# Patient Record
Sex: MALE | Race: WHITE | NOT HISPANIC OR LATINO | Employment: FULL TIME | URBAN - METROPOLITAN AREA
[De-identification: names, ages, dates, MRNs, and addresses within clinical notes are randomized per-mention and may not be internally consistent; named-entity substitution may affect disease eponyms.]

---

## 2017-06-29 ENCOUNTER — ALLSCRIPTS OFFICE VISIT (OUTPATIENT)
Dept: OTHER | Facility: OTHER | Age: 27
End: 2017-06-29

## 2018-01-14 VITALS
SYSTOLIC BLOOD PRESSURE: 113 MMHG | WEIGHT: 315 LBS | TEMPERATURE: 98.8 F | DIASTOLIC BLOOD PRESSURE: 80 MMHG | HEIGHT: 72 IN | BODY MASS INDEX: 42.66 KG/M2

## 2018-08-20 ENCOUNTER — OFFICE VISIT (OUTPATIENT)
Dept: FAMILY MEDICINE CLINIC | Facility: CLINIC | Age: 28
End: 2018-08-20
Payer: COMMERCIAL

## 2018-08-20 VITALS
BODY MASS INDEX: 42.66 KG/M2 | WEIGHT: 315 LBS | SYSTOLIC BLOOD PRESSURE: 102 MMHG | HEIGHT: 72 IN | DIASTOLIC BLOOD PRESSURE: 70 MMHG | TEMPERATURE: 99.2 F

## 2018-08-20 DIAGNOSIS — H60.332 ACUTE SWIMMER'S EAR OF LEFT SIDE: Primary | ICD-10-CM

## 2018-08-20 PROBLEM — E66.01 MORBID OBESITY WITH BMI OF 40.0-44.9, ADULT (HCC): Status: ACTIVE | Noted: 2017-06-29

## 2018-08-20 PROCEDURE — 99213 OFFICE O/P EST LOW 20 MIN: CPT | Performed by: FAMILY MEDICINE

## 2018-08-20 PROCEDURE — 3008F BODY MASS INDEX DOCD: CPT | Performed by: FAMILY MEDICINE

## 2018-08-20 RX ORDER — NEOMYCIN SULFATE, POLYMYXIN B SULFATE AND HYDROCORTISONE 10; 3.5; 1 MG/ML; MG/ML; [USP'U]/ML
4 SUSPENSION/ DROPS AURICULAR (OTIC) 4 TIMES DAILY
Qty: 10 ML | Refills: 0 | Status: SHIPPED | OUTPATIENT
Start: 2018-08-20 | End: 2019-03-07 | Stop reason: ALTCHOICE

## 2018-08-20 NOTE — LETTER
August 20, 2018     Patient: Lucrecia Connor   YOB: 1990   Date of Visit: 8/20/2018       To Whom it May Concern:    Ariana Ulloa is under my professional care  He was seen in my office on 8/20/2018  He may return to work on 8/21/2018  If you have any questions or concerns, please don't hesitate to call           Sincerely,          Gabe Browning MD        CC: Mkiayla Settler Senderling

## 2018-08-20 NOTE — PROGRESS NOTES
Assessment/Plan:  No problem-specific Assessment & Plan notes found for this encounter  Diagnoses and all orders for this visit:    Acute swimmer's ear of left side  -     neomycin-polymyxin-hydrocortisone (CORTISPORIN) 0 35%-10,000 units/mL-1% otic suspension; Administer 4 drops into the left ear 4 (four) times a day          Subjective:   Chief Complaint   Patient presents with    Earache     Left   Saturday I noted my hearing was worse  Sunday started to hurt and couldnt sleep last night  Some drainage  Tried to irrigate with hot water  No swimming for a couple of weeks  No URI or LRI sx  Patient ID: Guillermina Reeves is a 24 y o  male  HPI  Patient is a 14-year-old male who states that on Saturday he thought the hearing on his left side was getting worse  By Sunday he started to have some otalgia and some minimal drainage by last night he could not sleep because the pain was so bad  He tried irrigate the year with hot water yesterday with no success  He has had no swimming recently but had been swimming as little as 2 weeks ago  He has no URI or L RI symptoms no fever or other constitutional symptoms  The following portions of the patient's history were reviewed and updated as appropriate: allergies, past medical history, past social history and problem list     ROS    Review of systems as per HPI  Objective:    Physical Exam   Constitutional: He appears well-developed and well-nourished  Morbidly obese and in no distress   HENT:   Right ear canal and TM are within normal limits  Left ear canal is severely swollen with erythema and some minimal drainage  There is no cerumen noted than limited view of tympanic membrane appears normal   A Merocel ear wick was inserted along with Cortisporin drops  Patient stated felt almost immediately better

## 2018-08-20 NOTE — ASSESSMENT & PLAN NOTE
The patient is suffering from an acute otitis external on the left  We placed an ear wick today with Cortisporin otic suspension  He is going to use same 4 drops q i d  for the next week  We instructed him to observe for wick extrusion in the next 24-48 hours  Will continue to use drops q i d  as noted above and no call 2-3 days if he is not seen dramatic improvement or in 1 week if his symptoms have not completely resolved  He agrees

## 2018-08-22 ENCOUNTER — OFFICE VISIT (OUTPATIENT)
Dept: FAMILY MEDICINE CLINIC | Facility: CLINIC | Age: 28
End: 2018-08-22
Payer: COMMERCIAL

## 2018-08-22 VITALS — DIASTOLIC BLOOD PRESSURE: 80 MMHG | TEMPERATURE: 98.3 F | SYSTOLIC BLOOD PRESSURE: 122 MMHG

## 2018-08-22 DIAGNOSIS — H60.332 ACUTE SWIMMER'S EAR OF LEFT SIDE: Primary | ICD-10-CM

## 2018-08-22 PROCEDURE — 1036F TOBACCO NON-USER: CPT | Performed by: FAMILY MEDICINE

## 2018-08-22 PROCEDURE — 99213 OFFICE O/P EST LOW 20 MIN: CPT | Performed by: FAMILY MEDICINE

## 2018-08-22 NOTE — PROGRESS NOTES
Assessment/Plan:  Acute swimmer's ear of left side  His ear wick was removed today  He states he felt some relief almost immediately  He is going to continue to use Cortisporin otic suspension q 4 hours while awake  Will continue to use moist warm compresses as well as Advil or Motrin  He is asked to call tomorrow with report of his condition  He agrees  Diagnoses and all orders for this visit:    Acute swimmer's ear of left side          Subjective:   Chief Complaint   Patient presents with    Earache        Patient ID: Katie Deleon is a 24 y o  male  HPI  The patient is a 56-year-old male who was seen yesterday with an acute otitis externa  A ear wick was placed and he was started on Cortisporin otic suspension  He states that initially he felt some improvement but he began having difficulty getting to drops into his ear  He had a difficult time sleeping last night  He used warm compresses but had increased otalgia and difficulty hearing  He states that today the year feel similar to yesterday though he feels like he has some less discomfort in his neck and submandibular region  He has had no fevers chills or constitutional symptoms  The following portions of the patient's history were reviewed and updated as appropriate: allergies, current medications, past medical history, past social history and problem list     ROS    Limited review of systems as per HPI  Objective:    Physical Exam   Constitutional: He is oriented to person, place, and time  He appears well-developed and well-nourished  No distress  Morbidly obese   HENT:   Right Ear: External ear normal    Left external ear reveals some minimal erythema around the distal ear canal   Ear wick is in place  This is removed without difficulty  Ear canal diameter is significantly larger than it was yesterday    He continues to have some erythema and induration of the canal   Observe portion of tympanic membrane appears normal   There is no significant drainage noted  Ear wick had no foul smell and has some thin yellow discharge   Neurological: He is alert and oriented to person, place, and time

## 2018-08-22 NOTE — ASSESSMENT & PLAN NOTE
His ear wick was removed today  He states he felt some relief almost immediately  He is going to continue to use Cortisporin otic suspension q 4 hours while awake  Will continue to use moist warm compresses as well as Advil or Motrin  He is asked to call tomorrow with report of his condition  He agrees

## 2018-08-22 NOTE — LETTER
August 22, 2018     Patient: Kyler Zhang   YOB: 1990   Date of Visit: 8/22/2018       To Whom it May Concern:    Lakia Dillon is under my professional care  He was seen in my office on 8/22/2018  He may return to work on 8/23/2018  If you have any questions or concerns, please don't hesitate to call           Sincerely,          Lety Dumont MD        CC: No Recipients

## 2019-03-07 ENCOUNTER — OFFICE VISIT (OUTPATIENT)
Dept: FAMILY MEDICINE CLINIC | Facility: CLINIC | Age: 29
End: 2019-03-07

## 2019-03-07 VITALS
DIASTOLIC BLOOD PRESSURE: 72 MMHG | TEMPERATURE: 98.9 F | BODY MASS INDEX: 42.66 KG/M2 | WEIGHT: 315 LBS | SYSTOLIC BLOOD PRESSURE: 116 MMHG | HEART RATE: 84 BPM | HEIGHT: 72 IN | OXYGEN SATURATION: 97 %

## 2019-03-07 DIAGNOSIS — G47.30 SLEEP-DISORDERED BREATHING: Primary | ICD-10-CM

## 2019-03-07 DIAGNOSIS — S81.011D LACERATION OF RIGHT KNEE, SUBSEQUENT ENCOUNTER: ICD-10-CM

## 2019-03-07 DIAGNOSIS — Z23 ENCOUNTER FOR IMMUNIZATION: ICD-10-CM

## 2019-03-07 PROBLEM — H60.332 ACUTE SWIMMER'S EAR OF LEFT SIDE: Status: RESOLVED | Noted: 2018-08-20 | Resolved: 2019-03-07

## 2019-03-07 PROBLEM — S81.011A LACERATION OF RIGHT KNEE: Status: ACTIVE | Noted: 2019-03-07

## 2019-03-07 PROCEDURE — 3008F BODY MASS INDEX DOCD: CPT | Performed by: FAMILY MEDICINE

## 2019-03-07 PROCEDURE — 99214 OFFICE O/P EST MOD 30 MIN: CPT | Performed by: FAMILY MEDICINE

## 2019-03-07 PROCEDURE — 1036F TOBACCO NON-USER: CPT | Performed by: FAMILY MEDICINE

## 2019-03-07 NOTE — PROGRESS NOTES
Assessment/Plan:  Laceration of right knee  Patient suffered a laceration of his right knee 3 weeks ago  He presents today for suture removal   He felt the 5 sutures were initially placed but only 3 were in countered  These were removed without difficulty  He does have a significant burden of scab over the wound  Initial attempted deep breathing produce significant bleeding  It was decided to leave the scab in place  Bandage was applied  He is asked to soak the scab frequently and soapy water until it sloughs  Should any sutures remain he is asked to return for further evaluation  Sleep-disordered breathing  The patient has multiple physical findings and symptoms consistent with obstructive sleep apnea  He has a neck circumference of 19 inches  He has witnessed apnea  He has sedentary sleeping  He has gasping with awakening  He has on refreshing sleep  We are going to refer him to [facility] for further evaluation  He agrees  Diagnoses and all orders for this visit:    Sleep-disordered breathing  -     Ambulatory referral to Sleep Medicine; Future    Laceration of right knee, subsequent encounter    Encounter for immunization  -     SYRINGE/SINGLE-DOSE VIAL: influenza vaccine, [id number], quadrivalent, 0 5 mL, preservative-free (AFLURIA, FLUARIX, FLULAVAL, FLUZONE)    BMI 50 0-59 9, adult ([facility] [address] )          Subjective:   Chief Complaint   Patient presents with    Suture / Staple Removal     R knee  pt declined the flu shot  Patient ID: Kanika Duvall is a 34 y o  male  HPI  The patient is a 56-year-old male who states that 3 weeks ago he slipped on ice and fell down striking his right knee while up in [place]  He went to a local urgent care center where he states the wound was repaired with 2 regular sutures and 3 mattress sutures he was told  Since that time he has had no issue with the wound    He has had no drainage, streaking no fevers chills or other symptoms  He seems to have normal range of motion in the knee  No calf pain edema X cetera  Additionally he states that 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 until he was evaluated for obstructive sleep apnea  Initially he declined but more recently he has been concerned that he has significant snoring, wakes up gasping for breath and girlfriend has noticed apnea  He also has on refreshing sleep and sedentary sleeping  He is morbidly obese  He has no chest pain shortness of breath focal neurologic symptoms, headaches X cetera  The following portions of the patient's history were reviewed and updated as appropriate: allergies, current medications, past family history, past medical history, past social history, past surgical history and problem list     Review of Systems   Constitution: Positive for weight loss  Negative for decreased appetite, malaise/fatigue, night sweats and weight gain  Respiratory: Positive for sleep disturbances due to breathing and snoring  Negative for cough, shortness of breath, sputum production and wheezing  Skin: Positive for itching  Negative for rash  Neurological: Positive for excessive daytime sleepiness  Negative for dizziness and headaches  Objective:    Physical Exam   Constitutional: He appears well-developed and well-nourished  No distress  Morbid obesity   Neck:   Neck circumference is 19 inches  Cardiovascular: Normal rate and regular rhythm  Pulmonary/Chest: Effort normal and breath sounds normal    Skin:   Patient has a horizontal wound over his right mid patella  It is approximately 2 5 cm in length  There is significant eschar noted in the area of the wound  The two mattress sutures are removed and 1 standard suture  These are the only sutures noted though again there is significant scabbing present  There is no drainage from the wound, no lymphangitis streaking X cetera     Psychiatric: He has a normal mood and affect  Thought content normal    Nursing note and vitals reviewed

## 2019-03-08 NOTE — ASSESSMENT & PLAN NOTE
The patient has multiple physical findings and symptoms consistent with obstructive sleep apnea  He has a neck circumference of 19 inches  He has witnessed apnea  He has sedentary sleeping  He has gasping with awakening  He has on refreshing sleep  We are going to refer him to [facility] for further evaluation  He agrees

## 2019-03-08 NOTE — ASSESSMENT & PLAN NOTE
Patient suffered a laceration of his right knee 3 weeks ago  He presents today for suture removal   He felt the 5 sutures were initially placed but only 3 were in countered  These were removed without difficulty  He does have a significant burden of scab over the wound  Initial attempted deep breathing produce significant bleeding  It was decided to leave the scab in place  Bandage was applied  He is asked to soak the scab frequently and soapy water until it sloughs  Should any sutures remain he is asked to return for further evaluation

## 2019-04-16 ENCOUNTER — OFFICE VISIT (OUTPATIENT)
Dept: SLEEP CENTER | Facility: CLINIC | Age: 29
End: 2019-04-16
Payer: COMMERCIAL

## 2019-04-16 VITALS
BODY MASS INDEX: 42.66 KG/M2 | HEIGHT: 72 IN | WEIGHT: 315 LBS | DIASTOLIC BLOOD PRESSURE: 70 MMHG | SYSTOLIC BLOOD PRESSURE: 110 MMHG

## 2019-04-16 DIAGNOSIS — G47.30 SLEEP-DISORDERED BREATHING: ICD-10-CM

## 2019-04-16 DIAGNOSIS — G47.33 OSA (OBSTRUCTIVE SLEEP APNEA): Primary | ICD-10-CM

## 2019-04-16 PROCEDURE — 99204 OFFICE O/P NEW MOD 45 MIN: CPT | Performed by: INTERNAL MEDICINE

## 2019-04-24 ENCOUNTER — HOSPITAL ENCOUNTER (OUTPATIENT)
Dept: SLEEP CENTER | Facility: CLINIC | Age: 29
Discharge: HOME | End: 2019-04-24
Payer: COMMERCIAL

## 2019-04-24 DIAGNOSIS — G47.33 OSA (OBSTRUCTIVE SLEEP APNEA): ICD-10-CM

## 2019-04-24 DIAGNOSIS — G47.30 SLEEP-DISORDERED BREATHING: ICD-10-CM

## 2019-04-24 PROCEDURE — 95810 POLYSOM 6/> YRS 4/> PARAM: CPT

## 2019-04-25 PROBLEM — G47.33 OSA (OBSTRUCTIVE SLEEP APNEA): Status: ACTIVE | Noted: 2019-03-07

## 2019-04-26 DIAGNOSIS — G47.33 OSA (OBSTRUCTIVE SLEEP APNEA): Primary | ICD-10-CM

## 2019-04-29 ENCOUNTER — TELEPHONE (OUTPATIENT)
Dept: SLEEP CENTER | Facility: CLINIC | Age: 29
End: 2019-04-29

## 2019-05-09 ENCOUNTER — HOSPITAL ENCOUNTER (OUTPATIENT)
Dept: SLEEP CENTER | Facility: CLINIC | Age: 29
Discharge: HOME | End: 2019-05-09
Payer: COMMERCIAL

## 2019-05-09 DIAGNOSIS — G47.33 OSA (OBSTRUCTIVE SLEEP APNEA): ICD-10-CM

## 2019-05-09 PROCEDURE — 95811 POLYSOM 6/>YRS CPAP 4/> PARM: CPT

## 2019-05-14 DIAGNOSIS — G47.33 OSA (OBSTRUCTIVE SLEEP APNEA): Primary | ICD-10-CM

## 2019-05-15 ENCOUNTER — TELEPHONE (OUTPATIENT)
Dept: SLEEP CENTER | Facility: CLINIC | Age: 29
End: 2019-05-15

## 2019-07-17 ENCOUNTER — OFFICE VISIT (OUTPATIENT)
Dept: SLEEP CENTER | Facility: CLINIC | Age: 29
End: 2019-07-17
Payer: COMMERCIAL

## 2019-07-17 VITALS
WEIGHT: 315 LBS | BODY MASS INDEX: 42.66 KG/M2 | SYSTOLIC BLOOD PRESSURE: 110 MMHG | DIASTOLIC BLOOD PRESSURE: 70 MMHG | HEIGHT: 72 IN

## 2019-07-17 DIAGNOSIS — G47.33 OSA (OBSTRUCTIVE SLEEP APNEA): Primary | ICD-10-CM

## 2019-07-17 PROCEDURE — 99214 OFFICE O/P EST MOD 30 MIN: CPT | Performed by: INTERNAL MEDICINE

## 2019-07-17 NOTE — PROGRESS NOTES
Progress Note - [address] [facility] OPQ:[id number] MRN: [id number]      Reason for Visit:  34 y o male here for PAP compliance check    Assessment:  Doing well with new PAP device  Sleep quality is improved and the patient feels less drowsy  Compliance data show utilization for greater than or equal to 70% of nights, for greater than or equal to 4 hours per night  Plan:  Adequate compliance and successful treatment    Follow up: One year    History of Present Illness:  History of KATE on PAP therapy  Meets adequate compliance  Review of Systems      Genitourinary none   Cardiology none   Gastrointestinal none   Neurology none   Constitutional none   Integumentary none   Psychiatry none   Musculoskeletal none   Pulmonary none   ENT none   Endocrine none   Hematological none           I have reviewed and updated the review of systems as necessary    Historical Information    Past Medical History:   Diagnosis Date    Acute swimmer's ear of left side 8/20/2018    Impacted cerumen of left ear 9/24/2015         No past surgical history on file        Social History     Socioeconomic History    Marital status: Single     Spouse name: None    Number of children: None    Years of education: None    Highest education level: None   Occupational History    None   Social Needs    Financial resource strain: None    Food insecurity:     Worry: None     Inability: None    Transportation needs:     Medical: None     Non-medical: None   Tobacco Use    Smoking status: Light Tobacco Smoker    Smokeless tobacco: Current User   Substance and Sexual Activity    Alcohol use: Never     Frequency: Never    Drug use: Never    Sexual activity: None   Lifestyle    Physical activity:     Days per week: None     Minutes per session: None    Stress: None   Relationships    Social connections:     Talks on phone: None     Gets together: None     Attends [employer] service: None     Active member of club or organization: None     Attends meetings of clubs or organizations: None     Relationship status: None    Intimate partner violence:     Fear of current or ex partner: None     Emotionally abused: None     Physically abused: None     Forced sexual activity: None   Other Topics Concern    None   Social History Narrative    Former smoker - As per [employer]          Family History   Problem Relation Age of Onset    Hypertension Father         Essential hypertension     Anxiety disorder Mother        Medications/Allergies:    No current outpatient medications on file  Objective    Vital Signs:   Vitals:    07/17/19 1318   BP: 110/70     [place] Sleepiness Scale: Total score: 9        Physical Exam:    General: Alert, appropriate, cooperative, overweight    Head: NC/AT    Skin: Warm, dry    Neuro: No motor abnormalities, cranial nerves appear intact    Extremity: No clubbing, cyanosis    PAP setting:   10 cm  DME Provider: [employer]  Test results:  AHI = 17 1    Counseling / Coordination of Care  Total clinic time spent today 20 minutes  A description of the counseling / coordination of care: Maintain compliance  Discussed equipment  MILI Patricio    Board Certified Sleep Specialist

## 2020-01-07 ENCOUNTER — OFFICE VISIT (OUTPATIENT)
Dept: FAMILY MEDICINE CLINIC | Facility: CLINIC | Age: 30
End: 2020-01-07
Payer: COMMERCIAL

## 2020-01-07 VITALS
OXYGEN SATURATION: 98 % | HEIGHT: 72 IN | DIASTOLIC BLOOD PRESSURE: 70 MMHG | BODY MASS INDEX: 42.66 KG/M2 | WEIGHT: 315 LBS | SYSTOLIC BLOOD PRESSURE: 112 MMHG | TEMPERATURE: 97.9 F | HEART RATE: 86 BPM

## 2020-01-07 DIAGNOSIS — M75.91 SUPRASPINATUS TENDINITIS, RIGHT: Primary | ICD-10-CM

## 2020-01-07 PROCEDURE — 3008F BODY MASS INDEX DOCD: CPT | Performed by: FAMILY MEDICINE

## 2020-01-07 PROCEDURE — 99214 OFFICE O/P EST MOD 30 MIN: CPT | Performed by: FAMILY MEDICINE

## 2020-01-07 PROCEDURE — 1036F TOBACCO NON-USER: CPT | Performed by: FAMILY MEDICINE

## 2020-01-07 RX ORDER — PREDNISONE 10 MG/1
10 TABLET ORAL DAILY
Qty: 22 TABLET | Refills: 0 | Status: SHIPPED | OUTPATIENT
Start: 2020-01-07 | End: 2020-09-21 | Stop reason: ALTCHOICE

## 2020-01-07 NOTE — ASSESSMENT & PLAN NOTE
The patient appears to have a rotator cuff tendinitis on the right, primarily supraspinatus tendinitis  He has no loss of bulk strength or tone  We are going to ask him to use ice as well as naproxen  He is going to limit overhead work in any kind of repetitive work  We asked him to call in 2 weeks if his symptoms are not improving  Will call sooner as needed  He agrees with this plan

## 2020-01-07 NOTE — PROGRESS NOTES
Assessment/Plan:  BMI 50 0-59 9, adult ([facility])  Efforts at diet exercise for weight loss  We have noted improved weight from previous visits  Supraspinatus tendinitis, right  The patient appears to have a rotator cuff tendinitis on the right, primarily supraspinatus tendinitis  He has no loss of bulk strength or tone  We are going to ask him to use ice as well as naproxen  He is going to limit overhead work in any kind of repetitive work  We asked him to call in 2 weeks if his symptoms are not improving  Will call sooner as needed  He agrees with this plan  BMI Counseling: Body mass index is 47 47 kg/m²  The BMI is above normal  Nutrition recommendations include decreasing portion sizes, encouraging healthy choices of fruits and vegetables, consuming healthier snacks, limiting drinks that contain sugar and moderation in carbohydrate intake  Exercise recommendations include moderate physical activity 150 minutes/week and exercising 3-5 times per week  No pharmacotherapy was ordered  Diagnoses and all orders for this visit:    Supraspinatus tendinitis, right  -     predniSONE 10 mg tablet; Take 1 tablet (10 mg total) by mouth daily 4 daily for 2 days then 3 daily for 2 days then 2 daily for 2 days then 1 daily for 4 days          Subjective:   Chief Complaint   Patient presents with    Shoulder Pain     R shoulder w/decreased rom  states the pain the shoots down into his back and bicep  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 and woke up the next day with R shoulder pain  Has waxed and waned since but now consistent for 2 weeks  Works as a   I can pull but not push  Abduction exacerbates it  Hurts trying to fall asleep  Patient ID: Juan English is a 34 y o  male  HPI  The patient is a 22-year-old male who presents today with complaint of right shoulder pain which has been persistent since mid November    It had wax and wane but has now been persistent and worsening for 2 weeks  He states that he was painting overhead for about 2 hours  He awoke the next day and had right shoulder pain which radiates into his posterior upper back as well as down his upper arm  He works as a  and 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  He feels like he can pull things toward him but he cannot push especially overhead  He states that raising his arm overhead exacerbates the pain  He has difficulty trying to fall asleep  He has no weakness of the upper extremity  He has no chest pain or shortness of breath  No paresthesias or dysesthesias  The following portions of the patient's history were reviewed and updated as appropriate: allergies, current medications, past medical history, past social history, past surgical history and problem list     Review of Systems   Constitution: Negative  Cardiovascular: Negative for chest pain and irregular heartbeat  Respiratory: Negative  Endocrine: Negative  Negative for polydipsia, polyphagia and polyuria  Hematologic/Lymphatic: Negative for adenopathy and bleeding problem  Does not bruise/bleed easily  Skin: Negative for rash  Musculoskeletal: Positive for joint pain, myalgias, neck pain and stiffness  Negative for falls  Gastrointestinal: Negative for bowel incontinence  Genitourinary: Negative for bladder incontinence  Neurological: Negative for headaches, paresthesias and sensory change  Objective:    Physical Exam   Constitutional: He is oriented to person, place, and time  He appears well-developed  Morbidly obese and in no distress   Cardiovascular: Normal rate and regular rhythm  Pulmonary/Chest: Effort normal and breath sounds normal    Musculoskeletal: He exhibits tenderness  Patient has tenderness of the supraspinatus region on the right  Resisted supraspinatus action produces increased discomfort  He has plus-minus impingement sign  He does have some diminished internal rotation    Abduction is relatively normal as is flexion  He has no loss of bulk strength or tone  Neurological: He is alert and oriented to person, place, and time  Skin: No rash noted  Psychiatric: He has a normal mood and affect   Thought content normal

## 2020-09-21 ENCOUNTER — OFFICE VISIT (OUTPATIENT)
Dept: FAMILY MEDICINE CLINIC | Facility: CLINIC | Age: 30
End: 2020-09-21
Payer: COMMERCIAL

## 2020-09-21 VITALS
WEIGHT: 315 LBS | BODY MASS INDEX: 42.66 KG/M2 | TEMPERATURE: 98.3 F | DIASTOLIC BLOOD PRESSURE: 80 MMHG | HEIGHT: 72 IN | SYSTOLIC BLOOD PRESSURE: 122 MMHG

## 2020-09-21 DIAGNOSIS — M54.42 ACUTE LEFT-SIDED LOW BACK PAIN WITH LEFT-SIDED SCIATICA: Primary | ICD-10-CM

## 2020-09-21 PROCEDURE — 1036F TOBACCO NON-USER: CPT | Performed by: FAMILY MEDICINE

## 2020-09-21 PROCEDURE — 3725F SCREEN DEPRESSION PERFORMED: CPT | Performed by: FAMILY MEDICINE

## 2020-09-21 PROCEDURE — 99214 OFFICE O/P EST MOD 30 MIN: CPT | Performed by: FAMILY MEDICINE

## 2020-09-21 RX ORDER — CYCLOBENZAPRINE HCL 10 MG
10 TABLET ORAL 3 TIMES DAILY PRN
Qty: 30 TABLET | Refills: 0 | Status: SHIPPED | OUTPATIENT
Start: 2020-09-21 | End: 2021-04-12 | Stop reason: ALTCHOICE

## 2020-09-21 RX ORDER — PREDNISONE 10 MG/1
10 TABLET ORAL DAILY
Qty: 22 TABLET | Refills: 0 | Status: SHIPPED | OUTPATIENT
Start: 2020-09-21 | End: 2021-04-12 | Stop reason: ALTCHOICE

## 2020-09-21 NOTE — ASSESSMENT & PLAN NOTE
The patient is a 79-year-old male with a new onset of lumbago with left-sided sciatic radiculitis  He has no incontinence of bowel or bladder and he has no weakness of his lower extremity presently  We are going to ask him to use ice and rest   Will give him a prednisone taper as well as some cyclobenzaprine  We gave him warnings in regard to both medications  We asked him to call next 48-72 hours if he is not improving  He was also given a handout on low back exercise  He is asked call sooner should he develop any the worrisome symptoms discussed above  He agrees with this plan

## 2020-09-21 NOTE — PROGRESS NOTES
BMI Counseling: Body mass index is 51 54 kg/m²  The BMI is above normal  Nutrition recommendations include decreasing portion sizes, encouraging healthy choices of fruits and vegetables, consuming healthier snacks, moderation in carbohydrate intake and increasing intake of lean protein  Exercise recommendations include moderate physical activity 150 minutes/week and exercising 3-5 times per week  No pharmacotherapy was ordered  Tobacco Cessation Counseling: Tobacco cessation counseling was provided  The patient is sincerely urged to quit consumption of tobacco  He is not ready to quit tobacco  Medication options discussed  Assessment/Plan:  Acute left-sided low back pain with left-sided sciatica  The patient is a 46-year-old male with a new onset of lumbago with left-sided sciatic radiculitis  He has no incontinence of bowel or bladder and he has no weakness of his lower extremity presently  We are going to ask him to use ice and rest   Will give him a prednisone taper as well as some cyclobenzaprine  We gave him warnings in regard to both medications  We asked him to call next 48-72 hours if he is not improving  He was also given a handout on low back exercise  He is asked call sooner should he develop any the worrisome symptoms discussed above  He agrees with this plan  Diagnoses and all orders for this visit:    Acute left-sided low back pain with left-sided sciatica  -     predniSONE 10 mg tablet; Take 1 tablet (10 mg total) by mouth daily 4 daily for 2 days then 3 daily for 2 days then 2 daily for 2 days then 1 daily for 4 days  -     cyclobenzaprine (FLEXERIL) 10 mg tablet; Take 1 tablet (10 mg total) by mouth 3 (three) times a day as needed for muscle spasms          Subjective:   Chief Complaint   Patient presents with    Back Pain     Pain started yesterday morning  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  Patient ID: Jossue Montiel is a 34 y o  male       Started with BP yesterday  L sided LE pain and numbness  Intermittent weakness with standing  Sitting and lying OK  No incontinence  No history  Awoke feeling like slept wrong  Worse through the day and overnight  Better this am but worsened pretty quickly  Severe with putting boots on  Saturday put up a new deer stand  Reached out and was pulling on a rope attached to a limb  HPI  Patient is a 81-year-old male who is morbidly obese with a history of obstructive sleep apnea who states he has had low back pain since yesterday morning  He has no significant history of back pain, intervention X cetera  He states that he awoke yesterday with some tightness in his lower back  This seemed to worsen through the day and improved overnight  He states that when he is on his feet pain returns and can radiate down his left lower extremity  He has no incontinence of bowel or bladder  Does feel that his left lower extremity gets weak with extended periods of weight-bearing  He woke this morning feeling like he slept on it wrong  He states that he tried to get up to go to work but it worsened pretty quickly  While bending to put his boots on he suffered severe pain with radiation to his left lower extremity  He notes that he did put a new deer stand a tree on Saturday and had to bend forward out from the tree trunk in an unusual position  Additionally he was pulling on a rope so that someone could cut limb out of the way  He has had no fevers chills or constitutional symptoms  He has had no GI or  symptoms  The following portions of the patient's history were reviewed and updated as appropriate: allergies, current medications, past family history, past medical history, past social history, past surgical history and problem list     Review of Systems   Constitution: Negative  Cardiovascular: Negative  Respiratory: Negative  Endocrine: Negative  Skin: Negative      Musculoskeletal: Positive for back pain, muscle weakness and stiffness  Negative for falls, muscle cramps and neck pain  Gastrointestinal: Negative for bowel incontinence  Genitourinary: Negative for bladder incontinence  Neurological: Positive for paresthesias and sensory change  Negative for disturbances in coordination, excessive daytime sleepiness, focal weakness, headaches and weakness  Psychiatric/Behavioral: Negative  Objective:    Physical Exam   Constitutional: He is oriented to person, place, and time  He appears well-developed  Obese in no distress   Cardiovascular: Normal rate and regular rhythm  Pulmonary/Chest: Effort normal and breath sounds normal    Abdominal: Soft  Bowel sounds are normal  He exhibits no mass  There is no abdominal tenderness  Musculoskeletal:         General: Tenderness present  No deformity or edema  Comments: Patient has some tenderness of the left to mid lower lumbar region  There is no step-off or deformity  There is no real sacroiliac tenderness  There is no sacral notch tenderness  Spinal extension does not exacerbate the pain  Twisting of the trunk does not exacerbate the pain  There is some exacerbation with forward flexion  Negative straight leg raise test   Normal DTRs bulk strength and tone presently  Neurological: He is alert and oriented to person, place, and time  He displays normal reflexes  Coordination normal    Skin: No rash noted  No erythema  Psychiatric: He has a normal mood and affect   Judgment and thought content normal

## 2020-09-25 DIAGNOSIS — M54.42 ACUTE LEFT-SIDED LOW BACK PAIN WITH LEFT-SIDED SCIATICA: Primary | ICD-10-CM

## 2020-09-25 RX ORDER — HYDROCODONE BITARTRATE AND ACETAMINOPHEN 5; 325 MG/1; MG/1
1 TABLET ORAL EVERY 6 HOURS PRN
Qty: 15 TABLET | Refills: 0 | Status: SHIPPED | OUTPATIENT
Start: 2020-09-25 | End: 2021-04-12 | Stop reason: ALTCHOICE

## 2020-09-25 RX ORDER — HYDROCODONE BITARTRATE AND ACETAMINOPHEN 5; 325 MG/1; MG/1
1 TABLET ORAL EVERY 6 HOURS PRN
Qty: 15 TABLET | Refills: 0 | Status: CANCELLED | OUTPATIENT
Start: 2020-09-25

## 2020-09-25 NOTE — PROGRESS NOTES
The patient calls today  He states he is in severe pain  He denies incontinence of bowel or bladder  Denies weakness of his lower extremities but cannot even sit on the toilet apparently  We are going to give him a small supply of Vicodin for the weekend    We are also going to refer him to the Comprehensive Sp[facility] program

## 2020-09-29 ENCOUNTER — NURSE TRIAGE (OUTPATIENT)
Dept: PHYSICAL THERAPY | Facility: OTHER | Age: 30
End: 2020-09-29

## 2020-09-29 DIAGNOSIS — M54.42 ACUTE LEFT-SIDED LOW BACK PAIN WITH LEFT-SIDED SCIATICA: Primary | ICD-10-CM

## 2020-09-29 NOTE — TELEPHONE ENCOUNTER
Additional Information   Negative: Is this related to a work injury?  Negative: Is this related to an MVA?  Negative: Are you currently recieving homecare services?  Negative: Has the patient had unexplained weight loss?  Negative: Does the patient have a fever?  Negative: Is the patient experiencing blood in sputum?  Negative: Is the patient experiencing urine retention?  Negative: Is the patient experiencing acute drop foot or paralysis?  Negative: Has the patient experienced major trauma? (fall from height, high speed collision, direct blow to spine) and is also experiencing nausea, light-headedness, or loss of consciousness?  Negative: Is this a chronic condition? 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 prior to the pain starting  Date of onset: 9/21/2020  Frequency of pain: intermittent  Quality of pain: aching    Protocols used: [facility] COMPREHENSIVE SPINE PROGRAM PROTOCOL    This RN did review in detail the Comprehensive Spine Program and what we can provide for their back pain  Patient is agreeable to being triaged by this RN and would like to proceed with Physical Therapy  Referral was placed for Physical Therapy at the [facility] site  Patients information was sent to the  to make evaluation appointment  Patient made aware that the PT office  will be calling to schedule the appointment  Patient was provided with the phone number to the PT office  No further questions and/or concerns were voiced by the patient at this time  Patient states understanding of the referral that was placed  Referral Closed

## 2021-04-08 DIAGNOSIS — Z20.822 EXPOSURE TO COVID-19 VIRUS: Primary | ICD-10-CM

## 2021-04-09 DIAGNOSIS — Z20.822 EXPOSURE TO COVID-19 VIRUS: ICD-10-CM

## 2021-04-09 LAB — SARS-COV-2 RNA RESP QL NAA+PROBE: NEGATIVE

## 2021-04-09 PROCEDURE — U0003 INFECTIOUS AGENT DETECTION BY NUCLEIC ACID (DNA OR RNA); SEVERE ACUTE RESPIRATORY SYNDROME CORONAVIRUS 2 (SARS-COV-2) (CORONAVIRUS DISEASE [COVID-19]), AMPLIFIED PROBE TECHNIQUE, MAKING USE OF HIGH THROUGHPUT TECHNOLOGIES AS DESCRIBED BY CMS-2020-01-R: HCPCS | Performed by: FAMILY MEDICINE

## 2021-04-09 PROCEDURE — U0005 INFEC AGEN DETEC AMPLI PROBE: HCPCS | Performed by: FAMILY MEDICINE

## 2021-04-12 ENCOUNTER — TELEMEDICINE (OUTPATIENT)
Dept: FAMILY MEDICINE CLINIC | Facility: CLINIC | Age: 31
End: 2021-04-12
Payer: COMMERCIAL

## 2021-04-12 VITALS — BODY MASS INDEX: 42.66 KG/M2 | TEMPERATURE: 99.2 F | HEIGHT: 72 IN | WEIGHT: 315 LBS

## 2021-04-12 DIAGNOSIS — Z20.822 EXPOSURE TO COVID-19 VIRUS: ICD-10-CM

## 2021-04-12 DIAGNOSIS — Z20.822 EXPOSURE TO COVID-19 VIRUS: Primary | ICD-10-CM

## 2021-04-12 PROCEDURE — U0003 INFECTIOUS AGENT DETECTION BY NUCLEIC ACID (DNA OR RNA); SEVERE ACUTE RESPIRATORY SYNDROME CORONAVIRUS 2 (SARS-COV-2) (CORONAVIRUS DISEASE [COVID-19]), AMPLIFIED PROBE TECHNIQUE, MAKING USE OF HIGH THROUGHPUT TECHNOLOGIES AS DESCRIBED BY CMS-2020-01-R: HCPCS | Performed by: FAMILY MEDICINE

## 2021-04-12 PROCEDURE — 99213 OFFICE O/P EST LOW 20 MIN: CPT | Performed by: FAMILY MEDICINE

## 2021-04-12 PROCEDURE — 3008F BODY MASS INDEX DOCD: CPT | Performed by: FAMILY MEDICINE

## 2021-04-12 PROCEDURE — 3725F SCREEN DEPRESSION PERFORMED: CPT | Performed by: FAMILY MEDICINE

## 2021-04-12 PROCEDURE — 1036F TOBACCO NON-USER: CPT | Performed by: FAMILY MEDICINE

## 2021-04-12 PROCEDURE — U0005 INFEC AGEN DETEC AMPLI PROBE: HCPCS | Performed by: FAMILY MEDICINE

## 2021-04-12 NOTE — ASSESSMENT & PLAN NOTE
Patient is a 68-year-old morbidly obese male with symptoms of fever, headache, myalgias as well as fatigue  His wife and stepson are currently suffering from [place]  He did have a negative PCR test on Friday but he was asymptomatic at that time  I believe it is likely that he is suffering from COVID-19  He is going to go for another test this afternoon  In the meantime will push fluids, rest and use antipyretics as necessary  Will also call or seek more urgent medical attention if he has significant progression of his symptoms especially shortness of breath  He agrees with this plan

## 2021-04-12 NOTE — PROGRESS NOTES
COVID-19 Outpatient Progress Note    Assessment/Plan:    Problem List Items Addressed This Visit        Other    Exposure to COVID-19 virus - Primary       Patient is a 22-year-old morbidly obese male with symptoms of fever, headache, myalgias as well as fatigue  His wife and stepson are currently suffering from [place]  He did have a negative PCR test on Friday but he was asymptomatic at that time  I believe it is likely that he is suffering from COVID-19  He is going to go for another test this afternoon  In the meantime will push fluids, rest and use antipyretics as necessary  Will also call or seek more urgent medical attention if he has significant progression of his symptoms especially shortness of breath  He agrees with this plan  Relevant Orders    Novel Coronavirus (Covid-19),PCR [facility] - Collected at [facility] or [facility]         Disposition:     I recommended self-quarantine for 10 days and to watch for symptoms until 14 days after exposure  If patient were to develop symptoms, they should self isolate and call our office for further guidance  I have spent 10 minutes directly with the patient  Greater than 50% of this time was spent in counseling/coordination of care regarding: prognosis, instructions for management and impressions  Encounter provider Raul Sultana MD    Provider located at [address] [address]    Recent Visits  No visits were found meeting these conditions  Showing recent visits within past 7 days and meeting all other requirements     Today's Visits  Date Type Provider Dept   04/12/21 Telemedicine Raul Sultana MD [facility]   Showing today's visits and meeting all other requirements     Future Appointments  No visits were found meeting these conditions     Showing future appointments within next 150 days and meeting all other requirements      This virtual check-in was done via [employer] and patient was informed that this is a secure, HIPAA-compliant platform  He agrees to proceed  Patient agrees to participate in a virtual check in via telephone or video visit instead of presenting to the office to address urgent/immediate medical needs  Patient is aware this is a billable service  After connecting through [facility], the patient was identified by name and date of birth  Nate Quintero was informed that this was a telemedicine visit and that the exam was being conducted confidentially over secure lines  My office door was closed  No one else was in the room  Nate Quintero acknowledged consent and understanding of privacy and security of the telemedicine visit  I informed the patient that I have reviewed his record in Epic and presented the opportunity for him to ask any questions regarding the visit today  The patient agreed to participate  Subjective:   Nate Quintero is a 31 y o  male who is concerned about COVID-19  Patient's symptoms include fever, chills, fatigue, myalgias and headache  Patient denies congestion, rhinorrhea, sore throat, anosmia, loss of taste, cough, shortness of breath, chest tightness, nausea, vomiting and diarrhea       Date of exposure: 4/4/2021    Exposure:   Contact with a person who is under investigation (PUI) for or who is positive for COVID-19 within the last 14 days?: Yes    Hospitalized recently for fever and/or lower respiratory symptoms?: No      Currently a healthcare worker that is involved in direct patient care?: No      Works in a special setting where the risk of COVID-19 transmission may be high? (this may include long-term care, correctional and intermediate facilities; homeless shelters; assisted-living facilities and group homes ): No      Resident in a special setting where the risk of COVID-19 transmission may be high? (this may include long-term care, correctional and intermediate facilities; homeless shelters; assisted-living facilities and group homes ): No        Patient is a 72-year-old male who states that beginning yesterday he developed fevers feeling with night sweats and chills  He did have a documented fever  He has headaches and muscle aches  He feels fatigued  He has no anosmia or dysgeusia  He has no chest tightness shortness of breath or cough  He has no nausea vomiting or diarrhea  His wife is positive for COVID as well as his stepson  He did have a test on Friday which was negative  He was initially exposed last Sunday  Lab Results   Component Value Date    SARSCOV2 Negative 04/09/2021     Past Medical History:   Diagnosis Date    Acute swimmer's ear of left side 8/20/2018    Impacted cerumen of left ear 9/24/2015     No past surgical history on file  No current outpatient medications on file  No current facility-administered medications for this visit  No Known Allergies    Review of Systems   Constitutional: Positive for chills, fatigue and fever  HENT: Negative for congestion, rhinorrhea and sore throat  Respiratory: Negative for cough, chest tightness and shortness of breath  Gastrointestinal: Negative for diarrhea, nausea and vomiting  Musculoskeletal: Positive for myalgias  Neurological: Positive for headaches  Objective:    Vitals:    04/12/21 1134   Temp: 99 2 °F (37 3 °C)   Weight: (!) 161 kg (355 lb)   Height: 6' (1 829 m)       Physical Exam  Constitutional:       Appearance: He is obese  He is not ill-appearing  HENT:      Nose: No congestion  Pulmonary:      Effort: Pulmonary effort is normal  No respiratory distress  Neurological:      Mental Status: He is alert and oriented to person, place, and time  Psychiatric:         Mood and Affect: Mood normal          Thought Content: Thought content normal          Judgment: Judgment normal        VIRTUAL VISIT DISCLAIMER    Marcio Ponce acknowledges that he has consented to an online visit or consultation   He understands that the online visit is based solely on information provided by him, and that, in the absence of a face-to-face physical evaluation by the physician, the diagnosis he receives is both limited and provisional in terms of accuracy and completeness  This is not intended to replace a full medical face-to-face evaluation by the physician  Zayra Otero understands and accepts these terms

## 2021-04-13 LAB — SARS-COV-2 RNA RESP QL NAA+PROBE: POSITIVE

## 2021-06-17 ENCOUNTER — TELEPHONE (OUTPATIENT)
Dept: SLEEP CENTER | Facility: CLINIC | Age: 31
End: 2021-06-17

## 2021-06-17 NOTE — TELEPHONE ENCOUNTER
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

## 2021-08-17 ENCOUNTER — OFFICE VISIT (OUTPATIENT)
Dept: FAMILY MEDICINE CLINIC | Facility: CLINIC | Age: 31
End: 2021-08-17
Payer: COMMERCIAL

## 2021-08-17 VITALS
BODY MASS INDEX: 42.66 KG/M2 | WEIGHT: 315 LBS | DIASTOLIC BLOOD PRESSURE: 80 MMHG | SYSTOLIC BLOOD PRESSURE: 115 MMHG | TEMPERATURE: 98.2 F | HEIGHT: 72 IN | HEART RATE: 88 BPM | OXYGEN SATURATION: 96 %

## 2021-08-17 DIAGNOSIS — H60.332 ACUTE SWIMMER'S EAR OF LEFT SIDE: Primary | ICD-10-CM

## 2021-08-17 PROCEDURE — 99214 OFFICE O/P EST MOD 30 MIN: CPT | Performed by: FAMILY MEDICINE

## 2021-08-17 RX ORDER — NEOMYCIN SULFATE, POLYMYXIN B SULFATE AND HYDROCORTISONE 10; 3.5; 1 MG/ML; MG/ML; [USP'U]/ML
4 SUSPENSION/ DROPS AURICULAR (OTIC) 4 TIMES DAILY
Qty: 10 ML | Refills: 0 | Status: SHIPPED | OUTPATIENT
Start: 2021-08-17 | End: 2022-08-04 | Stop reason: SDUPTHER

## 2021-08-17 NOTE — ASSESSMENT & PLAN NOTE
We had a discussion of his morbid obesity today  He requested information on bariatric surgery which we discussed at length  He is going to consider and we will refer him to the bariatric practice if he prefers

## 2021-08-17 NOTE — PROGRESS NOTES
Assessment/Plan:  Acute swimmer's ear of left side    Patient is a 82-year-old male with an acute left-sided otitis externa  He was swimming in his pool which is probably the precipitating cause  He had significant swelling of his ear canal so we placed a Merocel wick  Cortisporin otic suspension was instilled  We gave him prescription for same and he will use 4 drops q i d  over the next 5-7 days  He is asked call if his symptoms do not resolve over the next week or so  He will call sooner as needed  He agrees with this plan  BMI 50 0-59 9, adult [facility])   We had a discussion of his morbid obesity today  He requested information on bariatric surgery which we discussed at length  He is going to consider and we will refer him to the bariatric practice if he prefers  Diagnoses and all orders for this visit:    Acute swimmer's ear of left side  -     neomycin-polymyxin-hydrocortisone (CORTISPORIN) 0 35%-10,000 units/mL-1% otic suspension; Administer 4 drops to the right ear 4 (four) times a day    BMI 50 0-59 9, adult ([facility])          Subjective:   Chief Complaint   Patient presents with    Earache     L ear pain after swimming  bmi f/u plan needed        Patient ID: Chidi Ahuja is a 31 y o  male  Started Saturday night, better Sunday, returned Monday and bad this am  Swimming in pool Friday, Saturday and Sun  No fever, Feels like face swollen, no drainage, worse hearing  No swollen glands or ST    HPI    The patient is a 82-year-old male who since today with concerns over L otalgia  States he began Saturday night  It seemed better on Sunday morning of by the time he woke up on Monday morning it was worse again  He states that by the time he woke up this morning it was severe  He has diminished hearing  No drainage  He has some periauricular pain as well   No adenopathy          The following portions of the patient's history were reviewed and updated as appropriate: allergies, current medications, past family history, past medical history, past social history, past surgical history and problem list     ROS      Limited pertinent per the review of systems  Objective:    Physical Exam  Vitals and nursing note reviewed  Constitutional:       Comments: Morbidly obese in no distress   HENT:      Right Ear: Tympanic membrane, ear canal and external ear normal       Ears:      Comments: He has significant swelling of left ear canal   It is nearly occluded though not completely  It is erythematous indurated with exudate  A Merocel wick was placed with Cortisporin drops  He tolerated the procedure well  Neurological:      Mental Status: He is alert and oriented to person, place, and time  Psychiatric:         Mood and Affect: Mood normal          Thought Content:  Thought content normal          Judgment: Judgment normal

## 2021-08-17 NOTE — ASSESSMENT & PLAN NOTE
Patient is a 56-year-old male with an acute left-sided otitis externa  He was swimming in his pool which is probably the precipitating cause  He had significant swelling of his ear canal so we placed a Merocel wick  Cortisporin otic suspension was instilled  We gave him prescription for same and he will use 4 drops q i d  over the next 5-7 days  He is asked call if his symptoms do not resolve over the next week or so  He will call sooner as needed  He agrees with this plan

## 2021-08-19 ENCOUNTER — OFFICE VISIT (OUTPATIENT)
Dept: FAMILY MEDICINE CLINIC | Facility: CLINIC | Age: 31
End: 2021-08-19
Payer: COMMERCIAL

## 2021-08-19 VITALS
OXYGEN SATURATION: 95 % | WEIGHT: 315 LBS | DIASTOLIC BLOOD PRESSURE: 70 MMHG | HEIGHT: 72 IN | HEART RATE: 94 BPM | TEMPERATURE: 99.3 F | SYSTOLIC BLOOD PRESSURE: 116 MMHG | BODY MASS INDEX: 42.66 KG/M2

## 2021-08-19 DIAGNOSIS — H60.332 ACUTE SWIMMER'S EAR OF LEFT SIDE: Primary | ICD-10-CM

## 2021-08-19 PROBLEM — M54.42 ACUTE LEFT-SIDED LOW BACK PAIN WITH LEFT-SIDED SCIATICA: Status: RESOLVED | Noted: 2020-09-21 | Resolved: 2021-08-19

## 2021-08-19 PROBLEM — Z20.822 EXPOSURE TO COVID-19 VIRUS: Status: RESOLVED | Noted: 2021-04-12 | Resolved: 2021-08-19

## 2021-08-19 PROBLEM — S81.011A LACERATION OF RIGHT KNEE: Status: RESOLVED | Noted: 2019-03-07 | Resolved: 2021-08-19

## 2021-08-19 PROCEDURE — 1036F TOBACCO NON-USER: CPT | Performed by: FAMILY MEDICINE

## 2021-08-19 PROCEDURE — 3008F BODY MASS INDEX DOCD: CPT | Performed by: FAMILY MEDICINE

## 2021-08-19 PROCEDURE — 99213 OFFICE O/P EST LOW 20 MIN: CPT | Performed by: FAMILY MEDICINE

## 2021-08-19 RX ORDER — AMOXICILLIN AND CLAVULANATE POTASSIUM 875; 125 MG/1; MG/1
1 TABLET, FILM COATED ORAL EVERY 12 HOURS SCHEDULED
Qty: 20 TABLET | Refills: 0 | Status: SHIPPED | OUTPATIENT
Start: 2021-08-19 | End: 2021-08-29

## 2021-08-19 RX ORDER — PREDNISONE 10 MG/1
10 TABLET ORAL DAILY
Qty: 22 TABLET | Refills: 0 | Status: SHIPPED | OUTPATIENT
Start: 2021-08-19 | End: 2022-08-04 | Stop reason: SDUPTHER

## 2021-08-19 NOTE — ASSESSMENT & PLAN NOTE
The patient has a left otitis externa with lateral cervical adenopathy  Will continue to use the Cortisporin drops  We are going to give him a prednisone taper as well as Augmentin  We considered Cipro but have concerns over the possibility of tendinopathy with this combination  Will use compresses  He is to avoid any water in his ear canal   He is asked call if he does not start seeing significant improvement over the next 24-48 hours  He agrees with this plan  He is not diabetic by history  He is not febrile today  He does not have polyuria polydipsia polyphagia  I do not believe this represents a malignant otitis externa but would have to consider that possibility if not improving fairly rapidly  He is asked call if he sees progression of his condition

## 2021-08-19 NOTE — PROGRESS NOTES
Assessment/Plan:  Acute swimmer's ear of left side   The patient has a left otitis externa with lateral cervical adenopathy  Will continue to use the Cortisporin drops  We are going to give him a prednisone taper as well as Augmentin  We considered Cipro but have concerns over the possibility of tendinopathy with this combination  Will use compresses  He is to avoid any water in his ear canal   He is asked call if he does not start seeing significant improvement over the next 24-48 hours  He agrees with this plan  He is not diabetic by history  He is not febrile today  He does not have polyuria polydipsia polyphagia  I do not believe this represents a malignant otitis externa but would have to consider that possibility if not improving fairly rapidly  He is asked call if he sees progression of his condition  Diagnoses and all orders for this visit:    Acute swimmer's ear of left side  -     predniSONE 10 mg tablet; Take 1 tablet (10 mg total) by mouth daily 4 daily for 2 days then 3 daily for 2 days then 2 daily for 2 days then 1 daily for 4 days  -     amoxicillin-clavulanate (AUGMENTIN) 875-125 mg per tablet; Take 1 tablet by mouth every 12 (twelve) hours for 10 days          Subjective:   Chief Complaint   Patient presents with    Earache     L ear swollen shut, inflammed and red, facial swelling  Patient ID: Eliza Deng is a 31 y o  male  HPI   the patient is a 21-year-old male who presents today with a complaint that he has low left ear pain and facial swelling  He was seen 2 days ago and had a Merocel ear wick placed  He states that it fell out that night  He has been using his drops but does not feel like they have been getting in his ear  He has had no fevers  He feels like his hearing is diminished  He has had no nausea vomiting X cetera    The following portions of the patient's history were reviewed and updated as appropriate: allergies, current medications, past family history, past medical history, past social history, past surgical history and problem list     ROS      Limited and pertinent per the HPI  Objective:    Physical Exam  Vitals reviewed  Constitutional:       Appearance: He is not ill-appearing  Comments: Uncomfortable   HENT:      Ears:      Comments: His canal is open though diminished in diameter by swelling of the canal with erythema and some exudate  We did administer Cortisporin drops which definitely made their way down the canal   He does have significant swelling of his infra-auricular /lateral cervical nodes  They are tender  Neurological:      Mental Status: He is alert and oriented to person, place, and time

## 2021-08-19 NOTE — PROGRESS NOTES
Assessment/Plan:  No problem-specific Assessment & Plan notes found for this encounter  {Assess/PlanSmartLinks:91339}      Subjective:   Chief Complaint   Patient presents with    Earache     L ear swollen shut, inflammed and red, facial swelling  Patient ID: Greyson Lui is a 31 y o  male      HPI    {Common ambulatory SmartLinks:88895}    ROS      Objective:    Physical Exam

## 2022-07-07 ENCOUNTER — TELEPHONE (OUTPATIENT)
Dept: SLEEP CENTER | Facility: CLINIC | Age: 32
End: 2022-07-07

## 2022-07-07 NOTE — TELEPHONE ENCOUNTER
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

## 2022-07-08 ENCOUNTER — OFFICE VISIT (OUTPATIENT)
Dept: SLEEP CENTER | Facility: CLINIC | Age: 32
End: 2022-07-08
Payer: COMMERCIAL

## 2022-07-08 VITALS
HEIGHT: 74 IN | WEIGHT: 315 LBS | BODY MASS INDEX: 40.43 KG/M2 | SYSTOLIC BLOOD PRESSURE: 132 MMHG | HEART RATE: 96 BPM | DIASTOLIC BLOOD PRESSURE: 84 MMHG | OXYGEN SATURATION: 97 %

## 2022-07-08 DIAGNOSIS — G47.33 OSA (OBSTRUCTIVE SLEEP APNEA): Primary | ICD-10-CM

## 2022-07-08 PROCEDURE — 99213 OFFICE O/P EST LOW 20 MIN: CPT | Performed by: INTERNAL MEDICINE

## 2022-07-08 NOTE — PROGRESS NOTES
Progress Note - [facility] SXB:[birth year] MRN: [id number]      Reason for Visit:  36 y o male here for annual follow-up    Assessment:  Doing well on current therapy of CPAP 10 cm for moderate KATE (AHI = 17 1)  The patient wears a full face mask  Plan:  Continue same    Follow up: One year    History of Present Illness:  History of KATE on PAP therapy  Fully compliant and deriving benefit  Review of Systems      Genitourinary none   Cardiology none   Gastrointestinal none   Neurology none   Constitutional none   Integumentary none   Psychiatry none   Musculoskeletal none   Pulmonary none   ENT none   Endocrine none   Hematological none           I have reviewed and updated the review of systems as necessary      Historical Information    Past Medical History:   Past Medical History:   Diagnosis Date    Acute left-sided low back pain with left-sided sciatica 9/21/2020    Acute swimmer's ear of left side 8/20/2018    Impacted cerumen of left ear 9/24/2015    Laceration of right knee 3/7/2019         Past Surgical History: No past surgical history on file      Social History:   Social History     Socioeconomic History    Marital status: Single     Spouse name: Not on file    Number of children: Not on file    Years of education: Not on file    Highest education level: Not on file   Occupational History    Not on file   Tobacco Use    Smoking status: Never Smoker    Smokeless tobacco: Current User     Types: Chew   Vaping Use    Vaping Use: Never used   Substance and Sexual Activity    Alcohol use: Never    Drug use: Never    Sexual activity: Not on file   Other Topics Concern    Not on file   Social History Narrative    Former smoker - As per [employer]      Social Determinants of Health     Financial Resource Strain: Not on file   Food Insecurity: Not on file   Transportation Needs: Not on file   Physical Activity: Not on file   Stress: Not on file   Social Connections: Not on file   Intimate Partner Violence: Not on file   Housing Stability: Not on file       Family History:   Family History   Problem Relation Age of Onset    Hypertension Father         Essential hypertension     Anxiety disorder Mother        Medications/Allergies:      Current Outpatient Medications:     neomycin-polymyxin-hydrocortisone (CORTISPORIN) 0 35%-10,000 units/mL-1% otic suspension, Administer 4 drops to the right ear 4 (four) times a day (Patient not taking: Reported on 7/8/2022), Disp: 10 mL, Rfl: 0    predniSONE 10 mg tablet, Take 1 tablet (10 mg total) by mouth daily 4 daily for 2 days then 3 daily for 2 days then 2 daily for 2 days then 1 daily for 4 days (Patient not taking: Reported on 7/8/2022), Disp: 22 tablet, Rfl: 0          Objective      Vital Signs:   Vitals:    07/08/22 0900   BP: 132/84   Pulse: 96   SpO2: 97%     Hinton Sleepiness Scale: Total score: 4        Physical Exam:    General: Alert, appropriate, cooperative, overweight    Head: NC/AT    Skin: Warm, dry    Neuro: No motor abnormalities, cranial nerves appear intact    Extremity: No clubbing, cyanosis      DME Provider: [employer]        Counseling / Coordination of Care   I have spent 15 minutes with the patient today in which greater than 50% of this time was spent in counseling/coordination of care regarding: equipment and compliance  Board Certified Sleep Specialist    Portions of the record may have been created with voice recognition software  Occasional wrong word or "sound a like" substitutions may have occurred due to the inherent limitations of voice recognition software  Read the chart carefully and recognize, using context, where substitutions have occurred

## 2022-07-11 ENCOUNTER — TELEPHONE (OUTPATIENT)
Dept: SLEEP CENTER | Facility: CLINIC | Age: 32
End: 2022-07-11

## 2022-07-11 NOTE — TELEPHONE ENCOUNTER
Rx for resupply order sent to [facility] [place] via [facility]

## 2022-08-04 ENCOUNTER — OFFICE VISIT (OUTPATIENT)
Dept: FAMILY MEDICINE CLINIC | Facility: CLINIC | Age: 32
End: 2022-08-04
Payer: COMMERCIAL

## 2022-08-04 VITALS
DIASTOLIC BLOOD PRESSURE: 80 MMHG | SYSTOLIC BLOOD PRESSURE: 136 MMHG | HEIGHT: 73 IN | TEMPERATURE: 98.7 F | BODY MASS INDEX: 41.75 KG/M2 | WEIGHT: 315 LBS

## 2022-08-04 DIAGNOSIS — H60.332 ACUTE SWIMMER'S EAR OF LEFT SIDE: ICD-10-CM

## 2022-08-04 PROCEDURE — 99213 OFFICE O/P EST LOW 20 MIN: CPT | Performed by: FAMILY MEDICINE

## 2022-08-04 PROCEDURE — 3725F SCREEN DEPRESSION PERFORMED: CPT | Performed by: FAMILY MEDICINE

## 2022-08-04 RX ORDER — PREDNISONE 10 MG/1
10 TABLET ORAL DAILY
Qty: 22 TABLET | Refills: 0 | Status: SHIPPED | OUTPATIENT
Start: 2022-08-04

## 2022-08-04 RX ORDER — NEOMYCIN SULFATE, POLYMYXIN B SULFATE AND HYDROCORTISONE 10; 3.5; 1 MG/ML; MG/ML; [USP'U]/ML
4 SUSPENSION/ DROPS AURICULAR (OTIC) 4 TIMES DAILY
Qty: 10 ML | Refills: 0 | Status: SHIPPED | OUTPATIENT
Start: 2022-08-04

## 2022-08-04 NOTE — ASSESSMENT & PLAN NOTE
Patient has had a recurrence of his otitis externa  It appears to be mild presently but he did have a complicated course last summer  We are going to have him begin continue his Cortisporin otic  We did also refill this  Additionally we gave him a prednisone taper  He is going to pick this up but not begin it pending the course of his symptoms  If they appear to be worsening he is going to begin the prednisone taper  If he does not see improvement by Monday, 4 days from now he is asked call for re-evaluation or sooner as needed  He agrees with this plan

## 2022-08-04 NOTE — PROGRESS NOTES
BMI Counseling: Body mass index is 52 91 kg/m²  The BMI is above normal  Nutrition recommendations include decreasing portion sizes, encouraging healthy choices of fruits and vegetables, consuming healthier snacks, limiting drinks that contain sugar and moderation in carbohydrate intake  Exercise recommendations include moderate physical activity 150 minutes/week and exercising 3-5 times per week  No pharmacotherapy was ordered  Rationale for BMI follow-up plan is due to patient being overweight or obese  Depression Screening and Follow-up Plan: Patient was screened for depression during today's encounter  They screened negative with a PHQ-2 score of 0  Assessment/Plan:  Acute swimmer's ear of left side  Patient has had a recurrence of his otitis externa  It appears to be mild presently but he did have a complicated course last summer  We are going to have him begin continue his Cortisporin otic  We did also refill this  Additionally we gave him a prednisone taper  He is going to pick this up but not begin it pending the course of his symptoms  If they appear to be worsening he is going to begin the prednisone taper  If he does not see improvement by Monday, 4 days from now he is asked call for re-evaluation or sooner as needed  He agrees with this plan  Diagnoses and all orders for this visit:    Acute swimmer's ear of left side  -     neomycin-polymyxin-hydrocortisone (CORTISPORIN) 0 35%-10,000 units/mL-1% otic suspension; Administer 4 drops to the right ear 4 (four) times a day  -     predniSONE 10 mg tablet; Take 1 tablet (10 mg total) by mouth daily 4 daily for 2 days then 3 daily for 2 days then 2 daily for 2 days then 1 daily for 4 days          Subjective:   Chief Complaint   Patient presents with    Earache     Check left ear  Patient ID: Sukumar Chris is a 36 y o  male  Began M/T  I was going to go in the pool  Awoke feeling worse  Midday yesterday face seemed swollen  Started with drops and maybe a little better  No constitutional sx  HPI  The patient is a 53-year-old male with history of otitis externa treated last summer with a difficult time resolving the condition  It had been doing well until early this week/Monday or Tuesday he was going to get in the pool and noted that when he pulled his shirt off his ear felt tender  He decided not to go in the pool  He awoke the next day feeling worse  He began using otitis externa drops that he had left over from last summer  He states that he noted by mid day that his face seem to begin feeling swollen  He continue with the drops  By this morning he feels that it may be a slight bit improved  He has had no fevers chills or constitutional symptoms  No drainage from the ear  No loss of hearing  The following portions of the patient's history were reviewed and updated as appropriate: allergies, current medications, past family history, past medical history, past social history, past surgical history and problem list     ROS    Per the HPI  Objective:    Physical Exam  Vitals and nursing note reviewed  Constitutional:       Appearance: He is obese  He is not ill-appearing  HENT:      Right Ear: Tympanic membrane, ear canal and external ear normal  There is no impacted cerumen  Ears:      Comments: The left ear canal is minimally edematous with some erythema and some whitish debris noted  Some of this is removed without difficulty with a plastic curette  No trauma noted  Minimal tenderness with traction on the tragus  Lymphadenopathy:      Cervical: No cervical adenopathy  Neurological:      Mental Status: He is alert and oriented to person, place, and time

## 2022-10-12 PROBLEM — H60.332 ACUTE SWIMMER'S EAR OF LEFT SIDE: Status: RESOLVED | Noted: 2018-08-20 | Resolved: 2022-10-12

## 2023-06-26 ENCOUNTER — OFFICE VISIT (OUTPATIENT)
Dept: URGENT CARE | Facility: CLINIC | Age: 33
End: 2023-06-26
Payer: COMMERCIAL

## 2023-06-26 VITALS — HEART RATE: 75 BPM | RESPIRATION RATE: 18 BRPM | OXYGEN SATURATION: 98 %

## 2023-06-26 DIAGNOSIS — M62.838 MUSCLE SPASM: Primary | ICD-10-CM

## 2023-06-26 PROCEDURE — 99203 OFFICE O/P NEW LOW 30 MIN: CPT | Performed by: FAMILY MEDICINE

## 2023-06-26 NOTE — PROGRESS NOTES
[facility] [employer] [facility]        NAME: Yue Rosales is a 29 y o  male  : 1990    MRN: [id number]  DATE: 2023  TIME: 2:41 PM    Assessment and Plan   Muscle spasm [M62 838]  1  Muscle spasm              Patient Instructions     Trigger point within the glute min muscle, likely secondary to spasm  Recommended to take Ibuprofen or Tylenol for pain and continue stretching  Can apply heating pad if it seems to help  Follow up with PCP in 3-5 days  Proceed to  ER if symptoms worsen  Chief Complaint     Chief Complaint   Patient presents with   • Hip Pain     Patient c/o left hip pain x 2 weeks  Throbbing pain increases if he puts pressure on it  Saw Chiro - no improvement  Taking Motrin PRN for pain  History of Present Illness       Fadia Granda is a 29year old male presenting for left hip pain for 1 week  He states the pain began the day after he was working on his car  He states the pain in the middle of his left buttock and shoots down into the back of his leg  The pain is worse when he is sitting and gets a bit better with Motrin  He also feels better if he presses on it  Review of Systems   Review of Systems   Constitutional: Negative for chills, fatigue and fever  HENT: Negative for postnasal drip and sore throat  Respiratory: Negative for cough and shortness of breath  Cardiovascular: Negative for chest pain and palpitations  Gastrointestinal: Negative for abdominal pain, nausea and vomiting  Genitourinary: Negative for dysuria  Musculoskeletal: Positive for myalgias  Skin: Negative for rash  Neurological: Negative for dizziness, syncope, light-headedness, numbness and headaches  Psychiatric/Behavioral: Negative for agitation and confusion  All other systems reviewed and are negative          Current Medications       Current Outpatient Medications:   •  neomycin-polymyxin-hydrocortisone (CORTISPORIN) 0 35%-10,000 units/mL-1% otic suspension, Administer 4 drops to the right ear 4 (four) times a day (Patient not taking: Reported on 6/26/2023), Disp: 10 mL, Rfl: 0  •  predniSONE 10 mg tablet, Take 1 tablet (10 mg total) by mouth daily 4 daily for 2 days then 3 daily for 2 days then 2 daily for 2 days then 1 daily for 4 days (Patient not taking: Reported on 6/26/2023), Disp: 22 tablet, Rfl: 0    Current Allergies     Allergies as of 06/26/2023   • (No Known Allergies)            The following portions of the patient's history were reviewed and updated as appropriate: allergies, current medications, past family history, past medical history, past social history, past surgical history and problem list      Past Medical History:   Diagnosis Date   • Acute left-sided low back pain with left-sided sciatica 9/21/2020   • Acute swimmer's ear of left side 8/20/2018   • Impacted cerumen of left ear 9/24/2015   • Laceration of right knee 3/7/2019       History reviewed  No pertinent surgical history  Family History   Problem Relation Age of Onset   • Hypertension Father         Essential hypertension    • Anxiety disorder Mother          Medications have been verified  Objective   Pulse 75   Resp 18   SpO2 98%   No LMP for male patient  Physical Exam     Physical Exam  Vitals reviewed  Constitutional:       General: He is not in acute distress  Appearance: Normal appearance  He is not ill-appearing  HENT:      Head: Normocephalic and atraumatic  Eyes:      Conjunctiva/sclera: Conjunctivae normal    Musculoskeletal:         General: Tenderness and signs of injury present  Cervical back: Normal range of motion  Right hip: Normal       Left hip: Tenderness present  No deformity or bony tenderness  Normal range of motion  Normal strength  Comments: Tender to palpation over left buttock  Trigger point palpation will reproduce pain  Skin:     General: Skin is warm  Neurological:      General: No focal deficit present        Mental Status: He is alert     Psychiatric:         Mood and Affect: Mood normal          Behavior: Behavior normal          Judgment: Judgment normal

## 2025-01-13 ENCOUNTER — OFFICE VISIT (OUTPATIENT)
Dept: FAMILY MEDICINE CLINIC | Facility: CLINIC | Age: 35
End: 2025-01-13
Payer: COMMERCIAL

## 2025-01-13 VITALS
OXYGEN SATURATION: 97 % | DIASTOLIC BLOOD PRESSURE: 76 MMHG | BODY MASS INDEX: 44.1 KG/M2 | HEIGHT: 71 IN | WEIGHT: 315 LBS | HEART RATE: 85 BPM | SYSTOLIC BLOOD PRESSURE: 120 MMHG

## 2025-01-13 DIAGNOSIS — Z23 ENCOUNTER FOR IMMUNIZATION: ICD-10-CM

## 2025-01-13 DIAGNOSIS — E66.01 MORBID OBESITY WITH BMI OF 50.0-59.9, ADULT (HCC): ICD-10-CM

## 2025-01-13 DIAGNOSIS — Z00.00 ANNUAL PHYSICAL EXAM: Primary | ICD-10-CM

## 2025-01-13 DIAGNOSIS — Z13.29 SCREENING FOR THYROID DISORDER: ICD-10-CM

## 2025-01-13 DIAGNOSIS — Z13.220 SCREENING CHOLESTEROL LEVEL: ICD-10-CM

## 2025-01-13 DIAGNOSIS — Z13.0 SCREENING FOR DEFICIENCY ANEMIA: ICD-10-CM

## 2025-01-13 DIAGNOSIS — Z13.1 SCREENING FOR DIABETES MELLITUS: ICD-10-CM

## 2025-01-13 PROCEDURE — 90471 IMMUNIZATION ADMIN: CPT

## 2025-01-13 PROCEDURE — 90715 TDAP VACCINE 7 YRS/> IM: CPT

## 2025-01-13 PROCEDURE — 99395 PREV VISIT EST AGE 18-39: CPT | Performed by: NURSE PRACTITIONER

## 2025-01-13 NOTE — PATIENT INSTRUCTIONS
Schedule with weight management center  Check labs fasting at the hospital orders given to you today    Tetanus vaccine given today, repeat 10 years

## 2025-01-13 NOTE — PROGRESS NOTES
Adult Annual Physical  Name: Marcio Melaraerviki      : 1990      MRN: [id number]  Encounter Provider: LEXIS Tejada  Encounter Date: 2025   Encounter department: [facility]    Assessment & Plan  Annual physical exam  Check routine screening labs  UTD immunizations  UTD age related preventative measures       Morbid obesity with BMI of 50.0-59.9, adult (HCC)  Recommend checking labs  Continue with portion control  He is interested in consultation for weight loss surgery    Orders:    Ambulatory Referral to Weight Management; Future    Screening for thyroid disorder    Orders:    TSH, 3rd generation with Free T4 reflex; Future    Screening cholesterol level    Orders:    Lipid panel; Future    Screening for deficiency anemia    Orders:    CBC and differential; Future    Screening for diabetes mellitus    Orders:    Comprehensive metabolic panel; Future    Hemoglobin A1C; Future    Encounter for immunization    Orders:    TDAP VACCINE GREATER THAN OR EQUAL TO 8YO IM    Immunizations and preventive care screenings were discussed with patient today. Appropriate education was printed on patient's after visit summary.    Counseling:  Alcohol/drug use: discussed moderation in alcohol intake, the recommendations for healthy alcohol use, and avoidance of illicit drug use.  Dental Health: discussed importance of regular tooth brushing, flossing, and dental visits.  Injury prevention: discussed safety/seat belts, safety helmets, smoke detectors, carbon monoxide detectors, and smoking near bedding or upholstery.  Sexual health: discussed sexually transmitted diseases, partner selection, use of condoms, avoidance of unintended pregnancy, and contraceptive alternatives.  Exercise: the importance of regular exercise/physical activity was discussed. Recommend exercise 3-5 times per week for at least 30 minutes.       Depression Screening and Follow-up Plan: Patient was screened for  depression during today's encounter. They screened negative with a PHQ-2 score of 0.    Tobacco Cessation Counseling: Tobacco cessation counseling was provided. The patient is sincerely urged to quit consumption of tobacco. He is ready to quit tobacco. Medication options and side effects of medication discussed. Patient refused medication. He knows he needs to stop using chewing tobacco if he will have surgery  Not interested in medication at this time      History of Present Illness     Adult Annual Physical:  Patient presents for annual physical. 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     Diet and Physical Activity:  - Diet/Nutrition:. home cooked meals, doesnt eat out often  - Exercise: no formal exercise. he is active with work but no formal exercise routine    Depression Screening:  - PHQ-2 Score: 0    General Health:  - Sleep: sleeps well. sleep apnea compliant with CPAP  - Hearing: normal hearing right ear and normal hearing left ear.  - Vision: no vision problems.  - Dental: no dental visits for > 1 year and brushes teeth twice daily.    Review of Systems   Constitutional:  Negative for chills, fatigue and fever.   HENT:  Negative for ear pain and sore throat.    Eyes:  Negative for pain and visual disturbance.   Respiratory:  Negative for cough and shortness of breath.    Cardiovascular:   "Negative for chest pain and palpitations.   Gastrointestinal: Negative.  Negative for abdominal pain and vomiting.   Endocrine: Negative for polydipsia, polyphagia and polyuria.   Genitourinary:  Negative for dysuria, frequency and hematuria.   Musculoskeletal:  Positive for arthralgias and back pain.   Skin:  Negative for color change and rash.   Neurological:  Negative for seizures and syncope.   Hematological: Negative.    Psychiatric/Behavioral: Negative.     All other systems reviewed and are negative.        Objective   /76 (BP Location: Left arm, Patient Position: Sitting, Cuff Size: Large)   Pulse 85   Ht 5' 11\" (1.803 m)   Wt (!) 170 kg (375 lb)   SpO2 97%   BMI 52.30 kg/m²     Physical Exam  Vitals and nursing note reviewed.   Constitutional:       Appearance: Normal appearance. He is well-developed. He is obese.   HENT:      Head: Normocephalic and atraumatic.      Right Ear: Tympanic membrane, ear canal and external ear normal.      Left Ear: Tympanic membrane, ear canal and external ear normal.      Nose: Nose normal.      Mouth/Throat:      Mouth: Mucous membranes are moist.   Eyes:      Conjunctiva/sclera: Conjunctivae normal.      Pupils: Pupils are equal, round, and reactive to light.   Cardiovascular:      Rate and Rhythm: Normal rate and regular rhythm.      Pulses:           Radial pulses are 2+ on the right side and 2+ on the left side.      Heart sounds: Normal heart sounds. No murmur heard.  Pulmonary:      Effort: Pulmonary effort is normal. No respiratory distress.      Breath sounds: Normal breath sounds.   Abdominal:      General: Bowel sounds are normal.      Palpations: Abdomen is soft.      Tenderness: There is no abdominal tenderness.   Musculoskeletal:      Cervical back: Neck supple. No rigidity.      Right lower leg: No edema.      Left lower leg: No edema.   Lymphadenopathy:      Cervical: No cervical adenopathy.   Skin:     General: Skin is warm and dry.   Neurological: "      Mental Status: He is alert and oriented to person, place, and time.   Psychiatric:         Mood and Affect: Mood normal.         Behavior: Behavior normal.

## 2025-01-16 ENCOUNTER — RESULTS FOLLOW-UP (OUTPATIENT)
Dept: FAMILY MEDICINE CLINIC | Facility: CLINIC | Age: 35
End: 2025-01-16

## 2025-01-16 ENCOUNTER — APPOINTMENT (OUTPATIENT)
Dept: LAB | Facility: CLINIC | Age: 35
End: 2025-01-16
Payer: COMMERCIAL

## 2025-01-16 DIAGNOSIS — Z13.1 SCREENING FOR DIABETES MELLITUS: ICD-10-CM

## 2025-01-16 DIAGNOSIS — Z13.29 SCREENING FOR THYROID DISORDER: ICD-10-CM

## 2025-01-16 DIAGNOSIS — Z13.0 SCREENING FOR DEFICIENCY ANEMIA: ICD-10-CM

## 2025-01-16 DIAGNOSIS — Z13.220 SCREENING CHOLESTEROL LEVEL: ICD-10-CM

## 2025-01-16 LAB
ALBUMIN SERPL BCG-MCNC: 4.3 G/DL (ref 3.5–5)
ALP SERPL-CCNC: 98 U/L (ref 34–104)
ALT SERPL W P-5'-P-CCNC: 23 U/L (ref 7–52)
ANION GAP SERPL CALCULATED.3IONS-SCNC: 6 MMOL/L (ref 4–13)
AST SERPL W P-5'-P-CCNC: 12 U/L (ref 13–39)
BASOPHILS # BLD AUTO: 0.07 THOUSANDS/ΜL (ref 0–0.1)
BASOPHILS NFR BLD AUTO: 1 % (ref 0–1)
BILIRUB SERPL-MCNC: 0.54 MG/DL (ref 0.2–1)
BUN SERPL-MCNC: 18 MG/DL (ref 5–25)
CALCIUM SERPL-MCNC: 9.4 MG/DL (ref 8.4–10.2)
CHLORIDE SERPL-SCNC: 104 MMOL/L (ref 96–108)
CHOLEST SERPL-MCNC: 118 MG/DL (ref ?–200)
CO2 SERPL-SCNC: 31 MMOL/L (ref 21–32)
CREAT SERPL-MCNC: 0.89 MG/DL (ref 0.6–1.3)
EOSINOPHIL # BLD AUTO: 0.21 THOUSAND/ΜL (ref 0–0.61)
EOSINOPHIL NFR BLD AUTO: 2 % (ref 0–6)
ERYTHROCYTE [DISTWIDTH] IN BLOOD BY AUTOMATED COUNT: 13.5 % (ref 11.6–15.1)
EST. AVERAGE GLUCOSE BLD GHB EST-MCNC: 100 MG/DL
GFR SERPL CREATININE-BSD FRML MDRD: 113 ML/MIN/1.73SQ M
GLUCOSE P FAST SERPL-MCNC: 87 MG/DL (ref 65–99)
HBA1C MFR BLD: 5.1 %
HCT VFR BLD AUTO: 47.6 % (ref 36.5–49.3)
HDLC SERPL-MCNC: 33 MG/DL
HGB BLD-MCNC: 15.6 G/DL (ref 12–17)
IMM GRANULOCYTES # BLD AUTO: 0.08 THOUSAND/UL (ref 0–0.2)
IMM GRANULOCYTES NFR BLD AUTO: 1 % (ref 0–2)
LDLC SERPL CALC-MCNC: 59 MG/DL (ref 0–100)
LYMPHOCYTES # BLD AUTO: 2.39 THOUSANDS/ΜL (ref 0.6–4.47)
LYMPHOCYTES NFR BLD AUTO: 26 % (ref 14–44)
MCH RBC QN AUTO: 28.7 PG (ref 26.8–34.3)
MCHC RBC AUTO-ENTMCNC: 32.8 G/DL (ref 31.4–37.4)
MCV RBC AUTO: 88 FL (ref 82–98)
MONOCYTES # BLD AUTO: 0.73 THOUSAND/ΜL (ref 0.17–1.22)
MONOCYTES NFR BLD AUTO: 8 % (ref 4–12)
NEUTROPHILS # BLD AUTO: 5.56 THOUSANDS/ΜL (ref 1.85–7.62)
NEUTS SEG NFR BLD AUTO: 62 % (ref 43–75)
NONHDLC SERPL-MCNC: 85 MG/DL
NRBC BLD AUTO-RTO: 0 /100 WBCS
PLATELET # BLD AUTO: 274 THOUSANDS/UL (ref 149–390)
PMV BLD AUTO: 8.8 FL (ref 8.9–12.7)
POTASSIUM SERPL-SCNC: 3.8 MMOL/L (ref 3.5–5.3)
PROT SERPL-MCNC: 7 G/DL (ref 6.4–8.4)
RBC # BLD AUTO: 5.44 MILLION/UL (ref 3.88–5.62)
SODIUM SERPL-SCNC: 141 MMOL/L (ref 135–147)
TRIGL SERPL-MCNC: 130 MG/DL (ref ?–150)
TSH SERPL DL<=0.05 MIU/L-ACNC: 2.62 UIU/ML (ref 0.45–4.5)
WBC # BLD AUTO: 9.04 THOUSAND/UL (ref 4.31–10.16)

## 2025-01-16 PROCEDURE — 84443 ASSAY THYROID STIM HORMONE: CPT

## 2025-01-16 PROCEDURE — 80061 LIPID PANEL: CPT

## 2025-01-16 PROCEDURE — 85025 COMPLETE CBC W/AUTO DIFF WBC: CPT

## 2025-01-16 PROCEDURE — 36415 COLL VENOUS BLD VENIPUNCTURE: CPT

## 2025-01-16 PROCEDURE — 80053 COMPREHEN METABOLIC PANEL: CPT

## 2025-01-16 PROCEDURE — 83036 HEMOGLOBIN GLYCOSYLATED A1C: CPT

## 2025-01-16 NOTE — TELEPHONE ENCOUNTER
Relayed results to patient as per provider message. Patient expressed understanding and did not have any further questions.

## 2025-01-16 NOTE — RESULT ENCOUNTER NOTE
Carl Buckley, Your labs look good, recommend continuing with seeing bariatrics. Repeat labs in 1 year

## 2025-01-16 NOTE — TELEPHONE ENCOUNTER
----- Message from LEXIS [place] sent at 1/16/2025  8:18 AM EST -----  Hi Marcio, Your labs look good, recommend continuing with seeing bariatrics. Repeat labs in 1 year

## 2025-02-21 ENCOUNTER — OFFICE VISIT (OUTPATIENT)
Dept: BARIATRICS | Facility: CLINIC | Age: 35
End: 2025-02-21
Payer: COMMERCIAL

## 2025-02-21 VITALS
WEIGHT: 315 LBS | HEART RATE: 83 BPM | HEIGHT: 71 IN | SYSTOLIC BLOOD PRESSURE: 140 MMHG | DIASTOLIC BLOOD PRESSURE: 90 MMHG | BODY MASS INDEX: 44.1 KG/M2 | OXYGEN SATURATION: 98 %

## 2025-02-21 DIAGNOSIS — E66.813 CLASS 3 SEVERE OBESITY DUE TO EXCESS CALORIES WITH SERIOUS COMORBIDITY AND BODY MASS INDEX (BMI) OF 50.0 TO 59.9 IN ADULT (HCC): Primary | ICD-10-CM

## 2025-02-21 DIAGNOSIS — E66.01 CLASS 3 SEVERE OBESITY DUE TO EXCESS CALORIES WITH SERIOUS COMORBIDITY AND BODY MASS INDEX (BMI) OF 50.0 TO 59.9 IN ADULT (HCC): Primary | ICD-10-CM

## 2025-02-21 DIAGNOSIS — E66.01 MORBID OBESITY WITH BMI OF 50.0-59.9, ADULT (HCC): ICD-10-CM

## 2025-02-21 PROCEDURE — 99204 OFFICE O/P NEW MOD 45 MIN: CPT | Performed by: INTERNAL MEDICINE

## 2025-02-21 NOTE — ASSESSMENT & PLAN NOTE
Antiobesity Medications/Medical -- Class 3 obesity with co morbidities of KATE   Discussed GLP-1 medications such as Wegovy and Zepbound.  Requested patient inquire with his insurance regarding coverage  Reviewed oral medications: Patient has no contraindications to Topamax Wellbutrin naltrexone  Patient has no history of hypertension however his blood pressure was elevated today at 140/90.  So would avoid phentermine  Reviewed with patient limitations of oral medications to get him to his weight loss goals  Discussed bariatric surgical interventions available including Vertical Sleeve Gastrectomy and Mami-en-Y Gastric Bypass CURT with average expected weight loss for each. Patient will schedule a visit with bariatric surgical team for initial consultation  Patient is amenable to this option and is considering the Mami-en-Y gastric bypass        Nutrition:    Meet and work with a dietitian to obtain nutrition prescription  Avoid skipping breakfast.  Start the day with a protein rich breakfast ; could consider a meal replacement protein shake  Adding some snacks in between meals to reduce excess calories in the evening       Physical Activity:   20 minutes 3 days a week on treadmill   Resitance bands or free weights 2-3 days using YouTube video    Sleep: -  KATE on CPAP  Discussed indication of Zepbound for moderate to severe sleep apnea    Food Behaviors/Stress:   Patients main food behaviors are larger portions with main meals.

## 2025-02-21 NOTE — PROGRESS NOTES
Assessment/Plan     Marcio Ponce is 34 y.o. year old male  who comes in for consultation for assistance with weight management.     - Discussed options of HealthyCORE-Intensive Lifestyle Intervention Program, Very Low Calorie Diet-VLCD, and Conservative Program and the role of weight loss medications.  - Patient is interested in pursuing Conservative Program    -  Main drivers of patients elevated body weight -  genetics, excess calories, Physical inactivity, skipping meals, struggle with portion control, inadequate protein leading to poor satiety, and poor food quality    Class 3 severe obesity due to excess calories with serious comorbidity and body mass index (BMI) of 50.0 to 59.9 in adult (HCC)  Antiobesity Medications/Medical -- Class 3 obesity with co morbidities of KATE   Discussed GLP-1 medications such as Wegovy and Zepbound.  Requested patient inquire with his insurance regarding coverage  Reviewed oral medications: Patient has no contraindications to Topamax Wellbutrin naltrexone  Patient has no history of hypertension however his blood pressure was elevated today at 140/90.  So would avoid phentermine  Reviewed with patient limitations of oral medications to get him to his weight loss goals  Discussed bariatric surgical interventions available including Vertical Sleeve Gastrectomy and Mami-en-Y Gastric Bypass CURT with average expected weight loss for each. Patient will schedule a visit with bariatric surgical team for initial consultation  Patient is amenable to this option and is considering the Mami-en-Y gastric bypass        Nutrition:    Meet and work with a dietitian to obtain nutrition prescription  Avoid skipping breakfast.  Start the day with a protein rich breakfast ; could consider a meal replacement protein shake  Adding some snacks in between meals to reduce excess calories in the evening       Physical Activity:   20 minutes 3 days a week on treadmill   Resitance bands or free  weights 2-3 days using YouTube video    Sleep: -  KATE on CPAP  Discussed indication of Zepbound for moderate to severe sleep apnea    Food Behaviors/Stress:   Patients main food behaviors are larger portions with main meals.        Marcio was seen today for consult.    Diagnoses and all orders for this visit:    Class 3 severe obesity due to excess calories with serious comorbidity and body mass index (BMI) of 50.0 to 59.9 in adult ([facility])    Morbid obesity with BMI of 50.0-59.9, adult ([facility])  -     Ambulatory Referral to Weight Management          -In addition, please follow general recommendations below.          Return visit:  6-8 weeks        General Lifestyle recommendations:    Nutrition   -Avoid skipping meals. Avoid sugary beverages. At least 64oz of water daily.  Limit processed food, refined sugars and grain. Encourage  healthy choices for meals and snacks   -Focus on protein goals and non starchy fiber rich vegetables for satiety effect and to help support a calorie deficit.   - Emphasize portion control, well balanced macronutrient's (protein, carbohydrate, fat using MyPlate method )and adequate protein with each meal/snacks and distributing calories equally throughout the day along with.   -Advise starting the day with a protein breakfast   Behavioral/Stress   Food log via lashawn or provided paper log (lashawn options include www.[employer] Technologynesspal.com, sparkpeople.com, loseit.com, calorieking.com, [employer]). Encouraged mindful eating. Be sure to set aside time to eat, eat slowly, and savor your food. Consider meditation apps and/or taking a few minutes of mindfulness every AM. Understand the role of regarding the role of stress hormone cortisol in promoting weight gain and visceral fat accumulation. Weigh daily or atleast 2-3 times/ week  Physical Activity   Increase physical activity by 10 minutes daily. Gradually increase physical activity to a goal of 5 days per week for 30 minutes of MODERATE intensity ( should  "be able to pass the \"talk test\" but should not be able to sing. Target 150-300 minutes  PLUS 2 days per week of FULL BODY resistance training. Progression will be addressed at follow up visits. Encouraged contemplation regarding establishing a daily physical activity routine  - Resistance training along with increase protein intake is important to maintain and enhance metabolism  Sleep   Encourage sleep hygiene and importance of having adequate sleep duration at least > 6 hours to support response in weight loss efforts    Handouts provided :  THRIVE program at [facility]  MyPlate and food quality  Food log resources, phone lashawn or paper journal  Antiobesity medications options     - Discussed at length and the role of weight loss medications and medication options   - Explained the importance of making lifestyle changes in addition to starting any anti-obesity medications if the  patient chooses.  -  Initial weight loss goal of 5-10% weight loss for improved health  - Weight loss can improve patient's co-morbid conditions and/or prevent weight-related complications.  - Weight is not at goal and patient has been unable to achieve a meaningful weight loss above 5% using various programs and tools for more than 6 months    Marcio was seen today for consult.    Diagnoses and all orders for this visit:    Class 3 severe obesity due to excess calories with serious comorbidity and body mass index (BMI) of 50.0 to 59.9 in adult (HCC)    Morbid obesity with BMI of 50.0-59.9, adult ([facility])  -     Ambulatory Referral to Weight Management                      Total time spent reviewing chart, interviewing patient, examining patient, discussing plan, answering all questions, and documentin min, with >50% face-to-face time spent counseling patient on nonsurgical interventions for the treatment of excess weight. Discussed in detail nonsurgical options including intensive lifestyle intervention program, very low-calorie diet " "program and conservative program.  Discussed the role of weight loss medications.  Counseled patient on diet behavior and exercise modification for weight loss.        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 Lost it rapidly over 2 months and therefore did not keep it off for a long time. Was eating what his wife was craving during her pregnancy  Last summer - trying to portion control and healthier choices along with his wife who had the sleeve gastrectomy but was able to succeed in losing 20 lbs weight    Patient reports and other history-  He loves hunting and wants to get back into it.   Self referred, wife has had a sleeve  He knows family and friends who have had a   Wt Readings from Last 30 Encounters:   02/21/25 (!) 175 kg (386 lb 6.4 oz)   01/13/25 (!) 170 kg (375 lb)   08/04/22 (!) 182 kg (401 lb)   07/08/22 (!) 186 kg (410 lb)   08/19/21 (!) 178 kg (392 lb)   08/17/21 (!) 179 kg (394 lb)   04/12/21 (!) 161 kg (355 lb)   09/21/20 (!) 172 kg (380 lb)   01/07/20 (!) 159 kg (350 lb)   07/17/19 (!) 170 kg (375 lb)   04/16/19 (!) 171 kg (377 lb)   03/07/19 (!) 170 kg (374 lb)   08/20/18 (!) 147 kg (325 lb)   06/29/17 (!) 166 kg (365 lb 15.9 oz)    BMI Readings from Last 30 Encounters:   02/21/25 54.27 kg/m²   01/13/25 52.30 kg/m²   08/04/22 52.91 kg/m²   07/08/22 52.89 kg/m²   08/19/21 53.16 kg/m²   08/17/21 53.44 kg/m²   04/12/21 48.15 kg/m²   09/21/20 51.54 kg/m²   01/07/20 47.47 kg/m²   07/17/19 50.86 kg/m²   04/16/19 51.13 kg/m²   03/07/19 50.72 kg/m²   08/20/18 44.08 kg/m²   06/29/17 49.64 kg/m²                   Lifestyle questionnaire         Diet " recall:  B: skips or coffee  S:  L: Low carb wrap with lunch meat  S:  D: P/V/S- 6 PM  S: none    Frequency Eating out x/ week: 1 x/ 2week    Food behaviors--struggle controlling portions , sometimes unhealthy     Trouble area of the day: larger dinner    Beverages  Water--64  oz   Caffeine/tea--  12 oz   SSB -- none    Alcohol: 0 drinks/ week   Smoking: chewing tobacco- 15 years   Drug use: no    Social History     Substance and Sexual Activity   Alcohol Use Never      Social History     Tobacco Use   Smoking Status Never   Smokeless Tobacco Current    Types: Chew      Social History     Substance and Sexual Activity   Drug Use Never         Physical Activity --walking while on hunting trips     Sleep -- KATE on CPAP  ; 6-8 hours of sleep per night    Occupation-works as a  for the [employer]    Psycho social- lives with wife 2 kids 11, 5        Start date: 2/21/2025   Intial weight on 2/21/2025 : (!) 175 kg (386 lb 6.4 oz)    on 2/21/2025 :Body mass index is 54.27 kg/m².  Obesity Class: Above 40- Obesity Class III  Goal weight: 250 lbs    Waist circumference (inches): 60 Inches        Medication considerations/contraindications     -Patient denies personal history of pancreatitis. Patient also denies personal and family history of medullary thyroid cancer, and multiple endocrine neoplasia type 2 (MEN 2 tumor). -Patient denies any history of kidney stones, seizures, or glaucoma, diabetic retinopathy, gall bladder disease, gastroparesis, hyperthyroidism.  -Denies Hx of CAD, PAD, palpitations, arrhythmia, uncontrolled hypertension  -Denies uncontrolled anxiety or depression, suicidal behavior or thinking , insomnia or sleep disturbance.         Past medical history/past surgical history       Previous notes and records have been reviewed.    The following portions of the patient's history were reviewed and updated as appropriate: allergies, current medications, past family history, past medical history,  "past social history, past surgical history, and problem list.    Past Medical History:   Diagnosis Date    Acute left-sided low back pain with left-sided sciatica 9/21/2020    Acute swimmer's ear of left side 8/20/2018    Impacted cerumen of left ear 9/24/2015    Laceration of right knee 3/7/2019         Past Surgical History:   Procedure Laterality Date    TONSILLECTOMY Bilateral 01/2000             Family History   Problem Relation Age of Onset    Hypertension Father         Essential hypertension     Anxiety disorder Mother             Objective     /90 (BP Location: Right arm, Patient Position: Sitting, Cuff Size: Large)   Pulse 83   Ht 5' 10.75\" (1.797 m)   Wt (!) 175 kg (386 lb 6.4 oz)   SpO2 98%   BMI 54.27 kg/m²     Review of Systems   Constitutional:  Negative for chills, fatigue and fever.   HENT:  Negative for ear pain and sore throat.    Eyes:  Negative for pain and visual disturbance.   Respiratory:  Negative for cough and shortness of breath.    Cardiovascular:  Negative for chest pain, palpitations and leg swelling.   Gastrointestinal:  Negative for abdominal pain, constipation, diarrhea, nausea and vomiting.   Genitourinary:  Negative for dysuria and hematuria.   Musculoskeletal:  Negative for arthralgias and back pain.   Skin:  Negative for color change and rash.   Neurological:  Negative for seizures, syncope and headaches.   Psychiatric/Behavioral:  Negative for dysphoric mood. The patient is not nervous/anxious.    All other systems reviewed and are negative.    Physical Exam  Vitals and nursing note reviewed.   Constitutional:       Appearance: Normal appearance.   HENT:      Head: Normocephalic.   Pulmonary:      Effort: Pulmonary effort is normal.   Neurological:      General: No focal deficit present.      Mental Status: He is alert and oriented to person, place, and time.   Psychiatric:         Mood and Affect: Mood normal.         Behavior: Behavior normal.         Thought " "Content: Thought content normal.         Judgment: Judgment normal.         Medications       Current Outpatient Medications:     neomycin-polymyxin-hydrocortisone (CORTISPORIN) 0.35%-10,000 units/mL-1% otic suspension, Administer 4 drops to the right ear 4 (four) times a day (Patient not taking: Reported on 6/26/2023), Disp: 10 mL, Rfl: 0    predniSONE 10 mg tablet, Take 1 tablet (10 mg total) by mouth daily 4 daily for 2 days then 3 daily for 2 days then 2 daily for 2 days then 1 daily for 4 days (Patient not taking: Reported on 6/26/2023), Disp: 22 tablet, Rfl: 0           Labs and imaging     Recent labs and imaging have been personally reviewed.  Lab Results   Component Value Date    WBC 9.04 01/16/2025    HGB 15.6 01/16/2025    HCT 47.6 01/16/2025    MCV 88 01/16/2025     01/16/2025     Lab Results   Component Value Date    SODIUM 141 01/16/2025    K 3.8 01/16/2025     01/16/2025    CO2 31 01/16/2025    AGAP 6 01/16/2025    BUN 18 01/16/2025    CREATININE 0.89 01/16/2025    GLUF 87 01/16/2025    CALCIUM 9.4 01/16/2025    AST 12 (L) 01/16/2025    ALT 23 01/16/2025    ALKPHOS 98 01/16/2025    TP 7.0 01/16/2025    TBILI 0.54 01/16/2025    EGFR 113 01/16/2025     Lab Results   Component Value Date    HGBA1C 5.1 01/16/2025     Lab Results   Component Value Date    AGJ6NJRYMJOU 2.623 01/16/2025     Lab Results   Component Value Date    CHOLESTEROL 118 01/16/2025     Lab Results   Component Value Date    HDL 33 (L) 01/16/2025     Lab Results   Component Value Date    TRIG 130 01/16/2025     Lab Results   Component Value Date    LDLCALC 59 01/16/2025     No results found for: \"[id number]\", \"LABINSU\"                  "

## 2025-02-23 NOTE — PROGRESS NOTES
"Weight Management Medical Nutrition Assessment  Marcio is here today for menu planning. Current weight 284#. Patient has struggled with his weight for most of his life. Reports significant weight increase over the last 5 years. Has had success with lifestyle changes but will struggle with regain. Patient suspects he may have been too restrictive with previous efforts. Patient looking to pursue bariatric surgery as his wife has a sleeve. Visit with surgical provider scheduled 3/13. 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 He prepares most of his foods himself but often skips breakfast with large portions later in the day. Reports he will occasionally bring snacks but will often skip snacking during the day or grab \"whatever's in the fridge\" or may stop for something. Encouraged patient to space calories more evenly throughout the day to support metabolic health. Low-calorie meal plan reviewed. Suggested protein shake in the AM (premier shake w/ fruit and peanut butter) to increase AM calories and daily protein. Discussed portion control at dinner meal and benefits of selecting lean protein sources. Patient is active with tree cutting business but otherwise has no structured activity. Reviewed activity goal of 150-300 minutes of aerobic activity with 2-3 days of full body strength and resistance training per week. Patient open to purchasing weights for himself as his wife has weights but they are fairly light. RD card provided and patient encouraged to reach out with questions prior to surgical evaluation.     Likes: eggs, protein shakes (premier, muscle milk powder), cheese, cottage cheese, nuts, peanut butter, hummus, tuna     Patient seen by Medical Provider in past 6 months:  yes  Requested to schedule appointment with Medical Provider: No    Anthropometric Measurements  Provider Start Weight (#): 386.4# " "(25)  Current Weight (#): 384#  TBW % Change from start weight: 0.6%  IBW: 170.5# (70.75\")  Goal Weight (#): ST# LT#  Baseline Weight (#): 325# until age 29    Weight Loss History  Previous weight loss attempts: Exercise  Self Created Diets (Portion Control, Healthy Food Choices, etc.)    Food and Nutrition Related History  Wake up: 6:30 am    Bed Time: 12 am   Sleep quality: well - uses CPAP    Dietary Recall  Coffee (may have some pretzels with coffee)  Breakfast: -- (9 times out of 10 - not hungry) Or occasionally, 2 eggs w/ 2 slices pork roll, sometimes sourdough   Snack: --  Lunch (11-12 pm): varies - low carb wrap w/ lunch meat + bag of chips Or yesterday was venison bologna w/ cheese Or on weekends, \"whatever's in the fridge\"  Snack: -- Or may snack on things around the house (sweets)  Dinner (7 pm): protein/vegetable/carb (may have large portions) Or tacos Or pork chop Or meatloaf Or yesterday was leftovers from daughters birthday party (baked ziti)   Snack: -- Or if working overnight (few times a month) may grab something from [employer] (often sweet tea and soft pretzel - \"I don't get anything greasy\")     Beverages: water (occasionally adds electrolyte beverage), brewed tea (adds 1/2 cup sugar to 1 gallon), coffee (12 oz)  Volume of beverage intake: 64-80 oz water    Weekends: will snack more   Cravings: sweets  Trouble area of day: large portions at dinner     Frequency of Eating out: rarely   Food restrictions: none   Cooking: self and wife  Food Shopping: self and wife     Physical Activity  Activity: No formal routine (enjoys hunting, has treadmill and some light weights at home)  Frequency: --  Physical limitations/barriers to exercise: nothing significant, occasional back pain     Estimated Needs  Bonnie Hamilton Energy Needs (needs at 386#)  BMR: 2,719 kcal  Maintenance calories (sedentary): 3,263 kcal  1-2#/week loss (sedentary): 2,263-2,763 kcal  1-2#/week loss (light activity): " 2,739-3,239 kcal    Protein:  grams (1.2-1.5g/kg IBW)  Total Fluid: 155 ounces (Polo-Segar Method) [#]  Free Fluid: 124 ounces (Ines-Segar Method - 20%)    Nutrition Diagnosis  Yes;    Overweight/obesity related to Excess energy intake as evidenced by BMI more than normative standard for age and sex (obesity-grade III 40+)     Nutrition Intervention    Nutrition Prescription  Calories: 2,200 (to start, can flex up to 2,700 kcal)  Protein: 116 g  Fluid: 124 ounces    Meal Plan (Jose/Pro)  Breakfast: 500/30-40  Snack: 250/5-15  Lunch: 600/40  Snack: 250/5-15  Dinner: 600/40  Snack: --    Nutrition Education  Calorie controlled menu  Lean protein food choices  Healthy snack options  Appropriate portions  Appropriate meal spacing    Nutrition Counseling  Strategies: motivational interviewing, goal setting, personalized meal planning    Monitoring and Evaluation:    Evaluation criteria  Energy and protein intake  Fluid intake  Monitor weekly weight  Meal planning/preparation  Calorie tracking/Measuring  Portion control   Physical activity     Barriers to change:none  Readiness to change: Preparation:  (Getting ready to change)   Comprehension: very good  Expected Compliance: good

## 2025-02-24 ENCOUNTER — CLINICAL SUPPORT (OUTPATIENT)
Dept: BARIATRICS | Facility: CLINIC | Age: 35
End: 2025-02-24

## 2025-02-24 VITALS — HEIGHT: 71 IN | BODY MASS INDEX: 44.1 KG/M2 | WEIGHT: 315 LBS

## 2025-02-24 DIAGNOSIS — R63.5 ABNORMAL WEIGHT GAIN: Primary | ICD-10-CM

## 2025-02-24 PROCEDURE — RECHECK

## 2025-02-24 PROCEDURE — WMDI30

## 2025-02-27 ENCOUNTER — CONSULT (OUTPATIENT)
Dept: BARIATRICS | Facility: CLINIC | Age: 35
End: 2025-02-27
Payer: COMMERCIAL

## 2025-02-27 VITALS
WEIGHT: 315 LBS | HEART RATE: 83 BPM | DIASTOLIC BLOOD PRESSURE: 74 MMHG | SYSTOLIC BLOOD PRESSURE: 118 MMHG | HEIGHT: 71 IN | BODY MASS INDEX: 44.1 KG/M2

## 2025-02-27 DIAGNOSIS — M75.91 SUPRASPINATUS TENDINITIS, RIGHT: ICD-10-CM

## 2025-02-27 DIAGNOSIS — E66.01 CLASS 3 SEVERE OBESITY DUE TO EXCESS CALORIES WITH SERIOUS COMORBIDITY AND BODY MASS INDEX (BMI) OF 50.0 TO 59.9 IN ADULT (HCC): ICD-10-CM

## 2025-02-27 DIAGNOSIS — G47.33 OSA (OBSTRUCTIVE SLEEP APNEA): Primary | ICD-10-CM

## 2025-02-27 DIAGNOSIS — E66.813 CLASS 3 SEVERE OBESITY DUE TO EXCESS CALORIES WITH SERIOUS COMORBIDITY AND BODY MASS INDEX (BMI) OF 50.0 TO 59.9 IN ADULT (HCC): ICD-10-CM

## 2025-02-27 PROCEDURE — 99204 OFFICE O/P NEW MOD 45 MIN: CPT | Performed by: SURGERY

## 2025-02-27 NOTE — PROGRESS NOTES
"    BARIATRIC INITIAL CONSULT - BARIATRIC SURGERY    Marcio Ponce 34 y.o. male MRN: [id number]  Unit/Bed#:  Encounter: [id number]      HPI:  Marcio Ponce is a 34 y.o. male who presents with a longstanding history of morbid obesity and inability to sustain a meaningful weight loss.  He is a . He is an avid mallorie and outdoors man. He desires to pursue etabolic and bariatric surgery to improve his health. Denies GERD. Rare NSAIDs. +Chewing Tobacco. Denies DVT/PE. No prior abd surgeries.   Here today to discuss bariatric options.    Visit type: initial visit    Symptoms: knee pain, back pain, and joint pain    Associated Symptoms: none    Associated Conditions: sleep apnea  Disease Complications: sleep apnea  Weight Loss Interest: high    Exercise Frequency:daily  Types of Exercise: strength training    Review of Systems    Historical Information   Past Medical History:   Diagnosis Date    Acute left-sided low back pain with left-sided sciatica 9/21/2020    Acute swimmer's ear of left side 8/20/2018    Impacted cerumen of left ear 9/24/2015    Laceration of right knee 3/7/2019     Past Surgical History:   Procedure Laterality Date    TONSILLECTOMY Bilateral 01/2000     Social History   Social History     Substance and Sexual Activity   Alcohol Use Never     Social History     Substance and Sexual Activity   Drug Use Never     Social History     Tobacco Use   Smoking Status Never   Smokeless Tobacco Current    Types: Chew   Tobacco Comments    Pt chews daily     Family History:  Obesity    Meds/Allergies   all medications and allergies reviewed  No Known Allergies    Objective     Current Vitals:   /74 (Patient Position: Sitting, Cuff Size: Large)   Pulse 83   Ht 5' 11\" (1.803 m)   Wt (!) 173 kg (381 lb 12.8 oz)   BMI 53.25 kg/m²     Invasive Devices       None                   Physical Exam  Constitutional:       Appearance: Normal appearance.   HENT:      Head: Atraumatic.      Nose: No " rhinorrhea.   Eyes:      Extraocular Movements: Extraocular movements intact.   Cardiovascular:      Rate and Rhythm: Normal rate.   Pulmonary:      Effort: Pulmonary effort is normal. No respiratory distress.   Abdominal:      General: Abdomen is flat. There is no distension.   Musculoskeletal:         General: Normal range of motion.      Cervical back: Normal range of motion.   Skin:     General: Skin is warm and dry.   Neurological:      General: No focal deficit present.      Mental Status: He is alert and oriented to person, place, and time.   Psychiatric:         Mood and Affect: Mood normal.         Behavior: Behavior normal.         Lab Results: I have personally reviewed pertinent lab results.    Imaging: Results Review Statement: I reviewed radiology reports from this admission including: Sleep study and compliance report.  EKG, Pathology, and Other Studies: Results Review Statement: I reviewed radiology reports from this admission including: As above.      Assessment/PLAN:    KATE (obstructive sleep apnea)  Moderate KATE  Compliant with CPAP  Continue care per treatment team    Class 3 severe obesity due to excess calories with serious comorbidity and body mass index (BMI) of 50.0 to 59.9 in adult (HCC)  34 y.o. yo male with a long standing h/o of obesity and inability to sustain any meaningful weight loss on his own despite several attempts.    He is interested in the Laparoscopic Mami-en-Y gastric bypass.    Patient has been counseled about the risk of developing gastroesophageal reflux disease (GERD), worsening of current GERD and/or silent reflux with the sleeve gastrectomy. Patient has also been counseled on the risk of developing Rosario's esophagus (18%). As a result the patient may require treatment with medications, further interventions and possibly additional surgery. Patient will require routine endoscopic surveillance to monitor for these possible complications.     As a part of his pre op  evaluation, he will be referred to a cardiologist and for a sleep evaluation and consult after successfully completing an evaluation with our pre-certification/, registered dietician and licensed clinical .     He needs an EGD to evaluate the anatomy of his GI tract.    I have spent over 45 minutes with him face to face in the office today discussing his options and details of the surgery. Over 50% of this was coordinating care.    I have discussed and educated the patient with regards to the components of our multidisciplinary program and the importance of compliance and follow up in the post operative period.    He was given the opportunity to ask questions and I have answered all of them.    The patient was also instructed with regards to the importance of behavior modification, nutritional counseling, support meeting attendance and lifestyle changes that are important to ensure success.    Although there is a great statistical chance of improvement or even resolution of most of his associated comorbidities, the results vary from patient to patient and they largely depend on his commitment and compliance.         Vasiliy Velazquez MD  2/27/2025  9:10 AM

## 2025-02-27 NOTE — ASSESSMENT & PLAN NOTE
34 y.o. yo male with a long standing h/o of obesity and inability to sustain any meaningful weight loss on his own despite several attempts.    He is interested in the Laparoscopic Mami-en-Y gastric bypass.    Patient has been counseled about the risk of developing gastroesophageal reflux disease (GERD), worsening of current GERD and/or silent reflux with the sleeve gastrectomy. Patient has also been counseled on the risk of developing Rosario's esophagus (18%). As a result the patient may require treatment with medications, further interventions and possibly additional surgery. Patient will require routine endoscopic surveillance to monitor for these possible complications.     As a part of his pre op evaluation, he will be referred to a cardiologist and for a sleep evaluation and consult after successfully completing an evaluation with our pre-certification/, registered dietician and licensed clinical .     He needs an EGD to evaluate the anatomy of his GI tract.    I have spent over 45 minutes with him face to face in the office today discussing his options and details of the surgery. Over 50% of this was coordinating care.    I have discussed and educated the patient with regards to the components of our multidisciplinary program and the importance of compliance and follow up in the post operative period.    He was given the opportunity to ask questions and I have answered all of them.    The patient was also instructed with regards to the importance of behavior modification, nutritional counseling, support meeting attendance and lifestyle changes that are important to ensure success.    Although there is a great statistical chance of improvement or even resolution of most of his associated comorbidities, the results vary from patient to patient and they largely depend on his commitment and compliance.

## 2025-02-27 NOTE — LETTER
February 27, 2025     LEXIS Tejada  [address] [facility] [id number]    Patient: Marcio Ponce   YOB: 1990   Date of Visit: 2/27/2025       Dear OJ Kelly:    Thank you for referring Marcio Ponce to me for evaluation for metabolic and bariatric surgery. Below are my notes for this consultation.    If you have questions, please do not hesitate to call me. I look forward to following your patient along with you.         Sincerely,        Vasiliy Velazquez MD        CC: MD Vasiliy Wise MD  2/27/2025  9:10 AM  Sign when Signing Visit      BARIATRIC INITIAL CONSULT - BARIATRIC SURGERY    Marcio Ponce 34 y.o. male MRN: [id number]  Unit/Bed#:  Encounter: [id number]      HPI:  Marcio Ponce is a 34 y.o. male who presents with a longstanding history of morbid obesity and inability to sustain a meaningful weight loss.  He is a . He is an avid mallorie and outdoors man. He desires to pursue etabolic and bariatric surgery to improve his health. Denies GERD. Rare NSAIDs. +Chewing Tobacco. Denies DVT/PE. No prior abd surgeries.   Here today to discuss bariatric options.    Visit type: initial visit    Symptoms: knee pain, back pain, and joint pain    Associated Symptoms: none    Associated Conditions: sleep apnea  Disease Complications: sleep apnea  Weight Loss Interest: high    Exercise Frequency:daily  Types of Exercise: strength training    Review of Systems    Historical Information  Past Medical History:   Diagnosis Date   • Acute left-sided low back pain with left-sided sciatica 9/21/2020   • Acute swimmer's ear of left side 8/20/2018   • Impacted cerumen of left ear 9/24/2015   • Laceration of right knee 3/7/2019     Past Surgical History:   Procedure Laterality Date   • TONSILLECTOMY Bilateral 01/2000     Social History  Social History     Substance and Sexual Activity   Alcohol Use Never     Social History  "    Substance and Sexual Activity   Drug Use Never     Social History     Tobacco Use   Smoking Status Never   Smokeless Tobacco Current   • Types: Chew   Tobacco Comments    Pt chews daily     Family History:  Obesity    Meds/Allergies  all medications and allergies reviewed  No Known Allergies    Objective    Current Vitals:   /74 (Patient Position: Sitting, Cuff Size: Large)   Pulse 83   Ht 5' 11\" (1.803 m)   Wt (!) 173 kg (381 lb 12.8 oz)   BMI 53.25 kg/m²     Invasive Devices       None                   Physical Exam  Constitutional:       Appearance: Normal appearance.   HENT:      Head: Atraumatic.      Nose: No rhinorrhea.   Eyes:      Extraocular Movements: Extraocular movements intact.   Cardiovascular:      Rate and Rhythm: Normal rate.   Pulmonary:      Effort: Pulmonary effort is normal. No respiratory distress.   Abdominal:      General: Abdomen is flat. There is no distension.   Musculoskeletal:         General: Normal range of motion.      Cervical back: Normal range of motion.   Skin:     General: Skin is warm and dry.   Neurological:      General: No focal deficit present.      Mental Status: He is alert and oriented to person, place, and time.   Psychiatric:         Mood and Affect: Mood normal.         Behavior: Behavior normal.         Lab Results: I have personally reviewed pertinent lab results.    Imaging: Results Review Statement: I reviewed radiology reports from this admission including: Sleep study and compliance report.  EKG, Pathology, and Other Studies: Results Review Statement: I reviewed radiology reports from this admission including: As above.      Assessment/PLAN:    KATE (obstructive sleep apnea)  Moderate KATE  Compliant with CPAP  Continue care per treatment team    Class 3 severe obesity due to excess calories with serious comorbidity and body mass index (BMI) of 50.0 to 59.9 in adult (HCC)  34 y.o. yo male with a long standing h/o of obesity and inability to sustain " any meaningful weight loss on his own despite several attempts.    He is interested in the Laparoscopic Mami-en-Y gastric bypass.    Patient has been counseled about the risk of developing gastroesophageal reflux disease (GERD), worsening of current GERD and/or silent reflux with the sleeve gastrectomy. Patient has also been counseled on the risk of developing Rosario's esophagus (18%). As a result the patient may require treatment with medications, further interventions and possibly additional surgery. Patient will require routine endoscopic surveillance to monitor for these possible complications.     As a part of his pre op evaluation, he will be referred to a cardiologist and for a sleep evaluation and consult after successfully completing an evaluation with our pre-certification/, registered dietician and licensed clinical .     He needs an EGD to evaluate the anatomy of his GI tract.    I have spent over 45 minutes with him face to face in the office today discussing his options and details of the surgery. Over 50% of this was coordinating care.    I have discussed and educated the patient with regards to the components of our multidisciplinary program and the importance of compliance and follow up in the post operative period.    He was given the opportunity to ask questions and I have answered all of them.    The patient was also instructed with regards to the importance of behavior modification, nutritional counseling, support meeting attendance and lifestyle changes that are important to ensure success.    Although there is a great statistical chance of improvement or even resolution of most of his associated comorbidities, the results vary from patient to patient and they largely depend on his commitment and compliance.         Vasiliy Velazquez MD  2/27/2025  9:10 AM

## 2025-03-13 ENCOUNTER — CLINICAL SUPPORT (OUTPATIENT)
Dept: BARIATRICS | Facility: CLINIC | Age: 35
End: 2025-03-13

## 2025-03-13 DIAGNOSIS — E66.01 MORBID OBESITY WITH BMI OF 50.0-59.9, ADULT (HCC): ICD-10-CM

## 2025-03-13 DIAGNOSIS — Z01.818 PRE-OPERATIVE CLEARANCE: Primary | ICD-10-CM

## 2025-03-13 PROCEDURE — RECHECK

## 2025-03-19 ENCOUNTER — CLINICAL SUPPORT (OUTPATIENT)
Dept: BARIATRICS | Facility: CLINIC | Age: 35
End: 2025-03-19

## 2025-03-19 VITALS — BODY MASS INDEX: 44.1 KG/M2 | WEIGHT: 315 LBS | HEIGHT: 71 IN

## 2025-03-19 VITALS — WEIGHT: 315 LBS | BODY MASS INDEX: 53.72 KG/M2

## 2025-03-19 DIAGNOSIS — E66.01 MORBID (SEVERE) OBESITY DUE TO EXCESS CALORIES (HCC): Primary | ICD-10-CM

## 2025-03-19 DIAGNOSIS — E66.01 MORBID OBESITY WITH BMI OF 50.0-59.9, ADULT (HCC): Primary | ICD-10-CM

## 2025-03-19 PROCEDURE — RECHECK

## 2025-03-19 NOTE — PROGRESS NOTES
"Bariatric Nutrition Assessment Note    Type of surgery    Preop (6 months wt checks) today 2 of 6 wt check  Surgery Date: TBD  Surgeon: Cristy Surgeons: Dr. Pinzon (consult was 2/27/25)    Nutrition Assessment   Marcio EUGENIO Kris  34 y.o.  male     Wt with BMI of 25: 178.6  Pre-Op Excess Wt: 203.6  Ht 5' 11\" (1.803 m)   Wt (!) 175 kg (385 lb 3.2 oz)   BMI 53.72 kg/m²   Start wt at surgeon consult 2/27/25:  381.8#    Estimated Needs  [employer] Needs (needs at 386#)  BMR: 2,719 kcal  Maintenance calories (sedentary): 3,263 kcal  1-2#/week loss (sedentary): 2,263-2,763 kcal  1-2#/week loss (light activity): 2,739-3,239 kcal     Protein:  grams (1.2-1.5g/kg IBW)  Total Fluid: 155 ounces ([place]-Segar Method) [#]  Free Fluid: 124 ounces ([place]-Segar Method - 20%)    Weight History   Onset of Obesity: Childhood, has always been a bigger kid, but in the past 5 years has gained the most.   Family history of obesity: Yes  Wt Loss Attempts: Exercise  High Protein/Low CHO diets (Atkins, [place], etc.)  Self Created Diets (Portion Control, Healthy Food Choices, etc.)  Met with RD     Patient has tried the above for 6 months or more with insufficient weight loss or weight regain, which is why patient has requested to be evaluated for weight loss surgery today  Maximum Wt Lost: -60#-portion control    Review of History and Medications   Past Medical History:   Diagnosis Date    Acute left-sided low back pain with left-sided sciatica 9/21/2020    Acute swimmer's ear of left side 8/20/2018    Impacted cerumen of left ear 9/24/2015    Laceration of right knee 3/7/2019     Past Surgical History:   Procedure Laterality Date    TONSILLECTOMY Bilateral 01/2000     Social History     Socioeconomic History    Marital status: /Civil Union     Spouse name: Not on file    Number of children: Not on file    Years of education: Not on file    Highest education level: Not on file   Occupational " Called and spoke to patient and informed that results are back, but have yet to be interpreted by CNM. Informed her once results have been interpreted, we will contact her with them.    Patient verbalized understanding to information given and had no further questions or concerns.           History    Not on file   Tobacco Use    Smoking status: Never    Smokeless tobacco: Current     Types: Chew    Tobacco comments:     Pt chews daily   Vaping Use    Vaping status: Never Used   Substance and Sexual Activity    Alcohol use: Never    Drug use: Never    Sexual activity: Not on file   Other Topics Concern    Not on file   Social History Narrative    Former smoker - As per Marco      Social Drivers of Health     Financial Resource Strain: Not on file   Food Insecurity: Not on file   Transportation Needs: Not on file   Physical Activity: Not on file   Stress: Not on file   Social Connections: Not on file   Intimate Partner Violence: Not on file   Housing Stability: Not on file     No current outpatient medications on file.    Food Intake and Lifestyle Assessment   Food Intake Assessment completed via usual diet recall  Works 3pm to 11pm  Wake: 6am  Breakfast: coffee with tsp sugar, then Premier protein shake (since meeting with RD)  Snack: occ since trying to quit chewing tabacco  Lunch: lunch meat or tuna warp   Snack: fruit or other   Dinner: leftover over from Sunday--pork tenderloin, potatoes and veggies (may be larger potions)  Snack: quitting the chewing tobacco and going for more hard candies or venison snack stick   Bed:  12am--wears CPAP  Weakness: cookies, but tires not to bring in the house.     Beverage intake: water and sugar free beverages (1/3 cup sugar in 1 gallon of tea), coffee with 1tsp of sugar  Protein supplement: Premier protein shake  Estimated protein intake per day: 80gm +  Estimated fluid intake per day: 6-7 16oz water bottles per day  Meals eaten away from home: 1x per week  Typical meal pattern: 3 (using protein shake for breakfast) meals per day and grazes on snacks per day  Eating Behaviors: Consumption of high calorie/ high fat foods, Consumption of high calorie beverages, Large portion sizes, Frequent snacking/ grazing, Mindless eating, and Emotional eating  Food  "allergies or intolerances: No Known Allergies  Cultural or Alevism considerations: -    Physical Assessment  Physical Activity  Types of exercise: None, tree removal and  for work, hunting for recreational   Current physical limitations: -    Psychosocial Assessment   Support systems: spouse friend(s)  Lives with wife, 2 kids (11 and 5 yrs old)  Socioeconomic factors: both do the cooking and food shopping    Nutrition Diagnosis  Diagnosis: Overweight / Obesity (NC-3.3)  Related to: Physical inactivity and Excessive energy intake  As Evidenced by: BMI >25     Nutrition Prescription: Recommend the following diet  Regular    Interventions and Teaching   Discussed pre-op and post-op nutrition guidelines.       Patient educated and handouts provided.  Surgical changes to stomach / GI  Capacity of post-surgery stomach  Diet progression  Adequate hydration  Sugar and fat restriction to decrease \"dumping syndrome\"  Fat restriction to decrease steatorrhea  Expected weight loss  Weight loss plateaus/ possibility of weight regain  Exercise  Suggestions for pre-op diet  Nutrition considerations after surgery  Protein supplements  Meal planning and preparation  Appropriate carbohydrate, protein, and fat intake, and food/fluid choices to maximize safe weight loss, nutrient intake, and tolerance   Dietary and lifestyle changes  Possible problems with poor eating habits  Intuitive eating  Techniques for self monitoring and keeping daily food journal  Potential for food intolerance after surgery, and ways to deal with them including: lactose intolerance, nausea, reflux, vomiting, diarrhea, food intolerance, appetite changes, gas  Vitamin / Mineral supplementation of Multivitamin with minerals and Vitamin D--currently takes a multi everyday and vit C at times.       Education provided to: patient    Barriers to learning: No barriers identified    Readiness to change: preparation    Prior research on procedure: books, " internet, discussed with provider, and friends or family    Comprehension: verbalizes understanding     Expected Compliance: good    Recommendations  Pt is an appropriate candidate for surgery. Yes    Evaluation / Monitoring  Dietitian to Monitor: Eating pattern as discussed Tolerance of nutrition prescription Body weight Lab values Physical activity    Pre-op weight goals:  Do NOT Gain  Can go to BMI of 35:   251#  Encouraged weight loss w/ diet and lifestyle changes  Will be started on a 2 week liver shrinking diet, possibly shorter/longer as per the discretion of the surgeon/team, directly prior to surgery    Workflow: (Incomplete in Bold):  Psych and/or D+A Clearance: n/a  Blood Work: had blood work in Jan, which is good until June. Will need repeat closer to sx.   PCP letter: completed  Surgeon Appt: completed on 2/27/25  EGD: Neg nicotine test needed to schedule  Cardiac Risk Assessment with ECG: scheduled for 5/19/25  Sleep Studies: wears CPAP  Nicotine test: Uses chewing tobacco - Pt currently working on quitting 5/14/25 quit, 6/11/2025 test   Pre-Operative Program: 2 of 6 today    Goals  Eliminate sugar sweetened beverages--avoid sugary iced tea, try to change sugar to non-mark sweetener in coffee  Food journal in baritastic  Exercise 30 minutes 5 times per week--start with walking on the treadmill and body weight exercises  Complete lesson plans 1-6  Eat 3 meals per day with protein at each meal  Eliminate mindless snacking  F/U with RD in 1 month    Time Spent:   1 Hour

## 2025-03-24 ENCOUNTER — TELEPHONE (OUTPATIENT)
Dept: CARDIOLOGY CLINIC | Facility: CLINIC | Age: 35
End: 2025-03-24

## 2025-04-25 ENCOUNTER — CLINICAL SUPPORT (OUTPATIENT)
Dept: BARIATRICS | Facility: CLINIC | Age: 35
End: 2025-04-25

## 2025-04-25 VITALS — BODY MASS INDEX: 44.1 KG/M2 | WEIGHT: 315 LBS | HEIGHT: 71 IN

## 2025-04-25 DIAGNOSIS — G47.33 OSA (OBSTRUCTIVE SLEEP APNEA): Primary | ICD-10-CM

## 2025-04-25 DIAGNOSIS — F17.221 NICOTINE DEPENDENCE, CHEWING TOBACCO, IN REMISSION: ICD-10-CM

## 2025-04-25 DIAGNOSIS — Z01.818 PRE-OP TESTING: ICD-10-CM

## 2025-04-25 DIAGNOSIS — E66.01 MORBID (SEVERE) OBESITY DUE TO EXCESS CALORIES (HCC): ICD-10-CM

## 2025-04-25 PROCEDURE — RECHECK

## 2025-04-25 NOTE — PROGRESS NOTES
"Bariatric Nutrition Assessment Note    Type of surgery    Preop (6 months wt checks) today 3of 6 wt check  Surgery Date: TBD  Surgeon: Cristy Surgeons: Dr. Pinzon (consult was 2/27/25)    Nutrition Assessment   Marcio BECKER Kris  34 y.o.  male     Wt with BMI of 25: 178.6  Pre-Op Excess Wt: 203.6  Ht 5' 11\" (1.803 m)   Wt (!) 177 kg (390 lb 9.6 oz)   BMI 54.48 kg/m²   Start wt at surgeon consult 2/27/25:  381.8#  Net weight change:+8.8#    Estimated Needs  [employer] Needs (needs at 386#)  BMR: 2,719 kcal  Maintenance calories (sedentary): 3,263 kcal  1-2#/week loss (sedentary): 2,263-2,763 kcal  1-2#/week loss (light activity): 2,739-3,239 kcal     Protein:  grams (1.2-1.5g/kg IBW)  Total Fluid: 155 ounces ([place]-Segar Method) [#]  Free Fluid: 124 ounces ([place]-Segar Method - 20%)    Weight History   Onset of Obesity: Childhood, has always been a bigger kid, but in the past 5 years has gained the most.   Family history of obesity: Yes  Wt Loss Attempts: Exercise  High Protein/Low CHO diets (Atkins, [place], etc.)  Self Created Diets (Portion Control, Healthy Food Choices, etc.)  Met with RD     Patient has tried the above for 6 months or more with insufficient weight loss or weight regain, which is why patient has requested to be evaluated for weight loss surgery today  Maximum Wt Lost: -60#-portion control    Review of History and Medications   Past Medical History:   Diagnosis Date    Acute left-sided low back pain with left-sided sciatica 9/21/2020    Acute swimmer's ear of left side 8/20/2018    Impacted cerumen of left ear 9/24/2015    Laceration of right knee 3/7/2019     Past Surgical History:   Procedure Laterality Date    TONSILLECTOMY Bilateral 01/2000     Social History     Socioeconomic History    Marital status: /Civil Union     Spouse name: Not on file    Number of children: Not on file    Years of education: Not on file    Highest education level: Not on " "file   Occupational History    Not on file   Tobacco Use    Smoking status: Never    Smokeless tobacco: Current     Types: Chew    Tobacco comments:     Pt chews daily   Vaping Use    Vaping status: Never Used   Substance and Sexual Activity    Alcohol use: Never    Drug use: Never    Sexual activity: Not on file   Other Topics Concern    Not on file   Social History Narrative    Former smoker - As per [employer]      Social Drivers of Health     Financial Resource Strain: Not on file   Food Insecurity: Not on file   Transportation Needs: Not on file   Physical Activity: Not on file   Stress: Not on file   Social Connections: Not on file   Intimate Partner Violence: Not on file   Housing Stability: Not on file     No current outpatient medications on file.    Food Intake and Lifestyle Assessment   Food Intake Assessment completed via usual diet recall  Works 3pm to 11pm  Wake: 6am  Breakfast: 7am-coffee with tsp sugar, 8am- Premier protein shake  Snack: if home is snacking more since quitting chewing tobacco   Lunch: leftovers OR lunch meat or tuna warp (makes at home)--mustard, braga, lunch meat, cheese (1 slice)  Snack: more snacking   Dinner: has cut back on the starches--salad with 6-8oz chicken OR any meat + veggies  Snack: quitting the chewing tobacco and going for more hard candies or venison snack stick or pretzels, etc.   Bed:  12am--wears CPAP  Weakness: cookies, but tires not to bring in the house.   Has been snacking more since quit chewing tobacco (first two weeks were the hardest, getting a little better in the last 2 weeks).  Tries for the \"healthier\" stuff like a venison meat stick, but often grazing on pretzels.     Beverage intake: water and sugar free beverages (1/3 cup sugar in 1 gallon of tea), coffee with 1tsp of sugar  Protein supplement: Premier protein shake for breakfast  Estimated protein intake per day: 80gm +  Estimated fluid intake per day: 6-7 16oz water bottles per day  Meals eaten " "away from home: 1x per week  Typical meal pattern: 3 (using protein shake for breakfast) meals per day and grazes on snacks per day  Eating Behaviors: Consumption of high calorie/ high fat foods, Consumption of high calorie beverages, Large portion sizes, Frequent snacking/ grazing, Mindless eating, and Emotional eating  Food allergies or intolerances: No Known Allergies  Cultural or Episcopalian considerations: -    Physical Assessment  Physical Activity  Types of exercise: None, tree removal and  for work, hunting for recreational --trying to get on the treadmill--got on about 3 days per week on average (1-1.5 miles on average)  Current physical limitations: -    Psychosocial Assessment   Support systems: spouse friend(s)  Lives with wife, 2 kids (11 and 5 yrs old)  Socioeconomic factors: both do the cooking and food shopping    Nutrition Diagnosis  Diagnosis: Overweight / Obesity (NC-3.3)  Related to: Physical inactivity and Excessive energy intake  As Evidenced by: BMI >25     Nutrition Prescription: Recommend the following diet  Regular    Interventions and Teaching   Discussed pre-op and post-op nutrition guidelines.       Patient educated and handouts provided.  Surgical changes to stomach / GI  Capacity of post-surgery stomach  Diet progression  Adequate hydration  Sugar and fat restriction to decrease \"dumping syndrome\"  Fat restriction to decrease steatorrhea  Expected weight loss  Weight loss plateaus/ possibility of weight regain  Exercise  Suggestions for pre-op diet  Nutrition considerations after surgery  Protein supplements  Meal planning and preparation  Appropriate carbohydrate, protein, and fat intake, and food/fluid choices to maximize safe weight loss, nutrient intake, and tolerance   Dietary and lifestyle changes  Possible problems with poor eating habits  Intuitive eating  Techniques for self monitoring and keeping daily food journal  Potential for food intolerance after surgery, and ways " to deal with them including: lactose intolerance, nausea, reflux, vomiting, diarrhea, food intolerance, appetite changes, gas  Vitamin / Mineral supplementation of Multivitamin with minerals and Vitamin D--currently takes a multi everyday and vit C at times.       Education provided to: patient    Barriers to learning: No barriers identified    Readiness to change: preparation    Prior research on procedure: books, internet, discussed with provider, and friends or family    Comprehension: verbalizes understanding     Expected Compliance: good    Recommendations  Pt is an appropriate candidate for surgery. Yes    Evaluation / Monitoring  Dietitian to Monitor: Eating pattern as discussed Tolerance of nutrition prescription Body weight Lab values Physical activity    Pre-op weight goals:  Do NOT Gain  Can go to BMI of 35:   251#  Encouraged weight loss w/ diet and lifestyle changes  Will be started on a 2 week liver shrinking diet, possibly shorter/longer as per the discretion of the surgeon/team, directly prior to surgery    Workflow: (Incomplete in Bold):  Psych and/or D+A Clearance: n/a  Blood Work: had blood work in Jan, which is good until June. Will need repeat closer to sx. Gave rx on 4/25 to repeat while gets nicotine test.  PCP letter: completed  Surgeon Appt: completed on 2/27/25  EGD: Neg nicotine test needed to schedule  Cardiac Risk Assessment with ECG: scheduled for 6/5/25  Sleep Studies: wears CPAP  Nicotine test: Uses chewing tobacco - Pt currently working on quitting 5/14/25 quit, 6/11/2025 test  (stopped around March 12th)--gave rx on 4/25  Pre-Operative Program: 3 of 6 today    Pt presents today for 3 of 6 pre-op wt check. He is noted to be up about 5# from last month. Pt believes he can attribute it to quitting chewing tobacco. He catches himself grazing often on snacks as a replacement. He is trying to avoid the snacking my drinking water and grabbing some fruit, but he often grazes on pretzels.   Discussed other tools to help avoid the snacking ie: drink water, low calorie food such as SF jello-O, chew gum, SF mints, find something to keep him busy.  Pt has been avoiding the chewing tobacco for over 30 days therefore provided with rx to check nicotine and other pre-op labs.     Goals  Eliminate sugar sweetened beverages--avoid sugary iced tea, try to change sugar to non-mark sweetener in coffee  Exercise 30 minutes 5 times per week--start with walking on the treadmill and body weight exercises  Complete lesson plans 1-6  Eat 3 meals per day with protein at each meal  Will work on decreasing the snacking since quitting chewing tobacco: chew gum, such on SF mints, drink water, find something else to distract him  Start using [employer] lashawn  Continue to read manual  F/U with SW in 1 month    Time Spent:   30 mins

## 2025-05-06 ENCOUNTER — APPOINTMENT (OUTPATIENT)
Dept: LAB | Facility: CLINIC | Age: 35
End: 2025-05-06
Attending: SURGERY
Payer: COMMERCIAL

## 2025-05-06 DIAGNOSIS — G47.33 OSA (OBSTRUCTIVE SLEEP APNEA): ICD-10-CM

## 2025-05-06 DIAGNOSIS — F17.221 NICOTINE DEPENDENCE, CHEWING TOBACCO, IN REMISSION: ICD-10-CM

## 2025-05-06 DIAGNOSIS — E66.01 MORBID (SEVERE) OBESITY DUE TO EXCESS CALORIES (HCC): ICD-10-CM

## 2025-05-06 DIAGNOSIS — Z01.818 PRE-OP TESTING: ICD-10-CM

## 2025-05-06 LAB
ALBUMIN SERPL BCG-MCNC: 4.3 G/DL (ref 3.5–5)
ALP SERPL-CCNC: 93 U/L (ref 34–104)
ALT SERPL W P-5'-P-CCNC: 29 U/L (ref 7–52)
ANION GAP SERPL CALCULATED.3IONS-SCNC: 7 MMOL/L (ref 4–13)
AST SERPL W P-5'-P-CCNC: 16 U/L (ref 13–39)
BILIRUB SERPL-MCNC: 0.44 MG/DL (ref 0.2–1)
BUN SERPL-MCNC: 16 MG/DL (ref 5–25)
CALCIUM SERPL-MCNC: 9.1 MG/DL (ref 8.4–10.2)
CHLORIDE SERPL-SCNC: 104 MMOL/L (ref 96–108)
CHOLEST SERPL-MCNC: 121 MG/DL (ref ?–200)
CO2 SERPL-SCNC: 28 MMOL/L (ref 21–32)
CREAT SERPL-MCNC: 0.73 MG/DL (ref 0.6–1.3)
ERYTHROCYTE [DISTWIDTH] IN BLOOD BY AUTOMATED COUNT: 13.5 % (ref 11.6–15.1)
EST. AVERAGE GLUCOSE BLD GHB EST-MCNC: 103 MG/DL
GFR SERPL CREATININE-BSD FRML MDRD: 122 ML/MIN/1.73SQ M
GLUCOSE P FAST SERPL-MCNC: 90 MG/DL (ref 65–99)
HBA1C MFR BLD: 5.2 %
HCT VFR BLD AUTO: 47.5 % (ref 36.5–49.3)
HDLC SERPL-MCNC: 40 MG/DL
HGB BLD-MCNC: 15.9 G/DL (ref 12–17)
LDLC SERPL CALC-MCNC: 55 MG/DL (ref 0–100)
MCH RBC QN AUTO: 29.2 PG (ref 26.8–34.3)
MCHC RBC AUTO-ENTMCNC: 33.5 G/DL (ref 31.4–37.4)
MCV RBC AUTO: 87 FL (ref 82–98)
NONHDLC SERPL-MCNC: 81 MG/DL
PLATELET # BLD AUTO: 291 THOUSANDS/UL (ref 149–390)
PMV BLD AUTO: 9.3 FL (ref 8.9–12.7)
POTASSIUM SERPL-SCNC: 3.8 MMOL/L (ref 3.5–5.3)
PROT SERPL-MCNC: 6.8 G/DL (ref 6.4–8.4)
RBC # BLD AUTO: 5.45 MILLION/UL (ref 3.88–5.62)
SODIUM SERPL-SCNC: 139 MMOL/L (ref 135–147)
TRIGL SERPL-MCNC: 132 MG/DL (ref ?–150)
TSH SERPL DL<=0.05 MIU/L-ACNC: 2.54 UIU/ML (ref 0.45–4.5)
WBC # BLD AUTO: 8.75 THOUSAND/UL (ref 4.31–10.16)

## 2025-05-06 PROCEDURE — G0480 DRUG TEST DEF 1-7 CLASSES: HCPCS

## 2025-05-06 PROCEDURE — 84443 ASSAY THYROID STIM HORMONE: CPT

## 2025-05-06 PROCEDURE — 83036 HEMOGLOBIN GLYCOSYLATED A1C: CPT

## 2025-05-06 PROCEDURE — 80323 ALKALOIDS NOS: CPT

## 2025-05-06 PROCEDURE — 36415 COLL VENOUS BLD VENIPUNCTURE: CPT

## 2025-05-06 PROCEDURE — 80061 LIPID PANEL: CPT

## 2025-05-06 PROCEDURE — 85027 COMPLETE CBC AUTOMATED: CPT

## 2025-05-06 PROCEDURE — 80053 COMPREHEN METABOLIC PANEL: CPT

## 2025-05-07 ENCOUNTER — TELEPHONE (OUTPATIENT)
Dept: BARIATRICS | Facility: CLINIC | Age: 35
End: 2025-05-07

## 2025-05-07 NOTE — TELEPHONE ENCOUNTER
Called and left message for pt advising that his pre-op lab were WNL and acceptable. Advised pt that his nicotine test is still pending and takes about 7-10 days to be results.  Will call pt when results come in.

## 2025-05-11 LAB
COTININE SERPL-MCNC: <1 NG/ML
NICOTINE SERPL-MCNC: <1 NG/ML

## 2025-05-15 ENCOUNTER — TELEPHONE (OUTPATIENT)
Dept: BARIATRICS | Facility: CLINIC | Age: 35
End: 2025-05-15

## 2025-05-15 ENCOUNTER — PREP FOR PROCEDURE (OUTPATIENT)
Dept: BARIATRICS | Facility: CLINIC | Age: 35
End: 2025-05-15

## 2025-05-15 DIAGNOSIS — E66.01 MORBID OBESITY (HCC): Primary | ICD-10-CM

## 2025-05-15 NOTE — TELEPHONE ENCOUNTER
Spoke to pt and advised that his nicotine test was negative therefor can now schedule his EGD. Transferred pt to the MR to get scheduled.

## 2025-05-20 ENCOUNTER — CLINICAL SUPPORT (OUTPATIENT)
Dept: BARIATRICS | Facility: CLINIC | Age: 35
End: 2025-05-20

## 2025-05-20 DIAGNOSIS — E66.01 MORBID OBESITY WITH BMI OF 50.0-59.9, ADULT (HCC): Primary | ICD-10-CM

## 2025-05-20 PROCEDURE — RECHECK

## 2025-05-20 NOTE — PROGRESS NOTES
Patient presents for 4 of 6 weight check, current weight 396.8lbs.    Eating behaviors/food choices: Patient reports continued efforts to manage snacking since quitting tobacco. During days he has more time on his hands he may be eating in response to boredom, still skipping meals at times. Reviewed the benefit of eating three meals a day with planned snacks for stabilize appetite, get adequate nutrition and managed snacking.    Mental Health/Wellness:  Patient is navigating some stress and changes, may not recognizes the impact they are playing on his weight management journey. Patient and wife are trying to reconcile their relationship, he is looking to move to a new job and they are in the process of building a new home. Patient feels he manages his stress well, he is proactive in getting things done. Reviewed how stress can be playing under the surface and influencing some of his choices, his focus being more external rather than internal on his own self care. Encouraged patient to take time to reflect on his structure, stressors and how he's managing, taking mindful moments to take care of himself and monitor his eating.    Workflow review:    Labs and PCP: both done  Psych and EGD: no eval needed, EGD 6/20  Nicotine: negative nicotine test obtained  Cardiology: scheduled 6/30  Sleep: uses CPAP  Weight Checks: 6     Goals:    - avoid skipping meals, plan in snacks  - practice stop, think, act to reduce mindless snacking  - reflect on the impact stress can be having on eating habits, invest in self care opportunities.    Next Appointment:  with SAM on 6/20

## 2025-05-21 ENCOUNTER — OFFICE VISIT (OUTPATIENT)
Dept: BARIATRICS | Facility: CLINIC | Age: 35
End: 2025-05-21
Payer: COMMERCIAL

## 2025-05-21 VITALS
WEIGHT: 315 LBS | HEIGHT: 71 IN | DIASTOLIC BLOOD PRESSURE: 80 MMHG | SYSTOLIC BLOOD PRESSURE: 120 MMHG | HEART RATE: 69 BPM | BODY MASS INDEX: 44.1 KG/M2

## 2025-05-21 DIAGNOSIS — E66.813 CLASS 3 SEVERE OBESITY DUE TO EXCESS CALORIES WITH SERIOUS COMORBIDITY AND BODY MASS INDEX (BMI) OF 50.0 TO 59.9 IN ADULT: ICD-10-CM

## 2025-05-21 PROCEDURE — 99214 OFFICE O/P EST MOD 30 MIN: CPT | Performed by: INTERNAL MEDICINE

## 2025-05-21 NOTE — PROGRESS NOTES
Assessment/Plan     Marcio Ponce is 34 y.o. year old male  who comes in for consultation for assistance with weight management.     - Discussed options of HealthyCORE-Intensive Lifestyle Intervention Program, Very Low Calorie Diet-VLCD, and Conservative Program and the role of weight loss medications.  - Patient is interested in pursuing Conservative Program    -  Main drivers of patients elevated body weight -  genetics, excess calories, Physical inactivity, skipping meals, struggle with portion control, inadequate protein leading to poor satiety, and poor food quality    Class 3 severe obesity due to excess calories with serious comorbidity and body mass index (BMI) of 50.0 to 59.9 in adult (HCC)  ntiobesity Medications/Medical -- Class 3 obesity with co morbidities of KATE   Patient currently the surgical pathway for Mami-en-Y gastric bypass and is here for his weight check  Since last office visit patient has gained 9 pounds.  Patient states this is secondary to him snacking and grazing more since he quit smoking 2 months ago as a requirement for surgical process.  Discussed GLP-1 medications such as Wegovy and Zepbound.  Requested patient inquire with his insurance regarding coverage. (Per EMR possibly Tier  2 of 3)  We discussed how antiobesity medications could help him optimize his weight prior to his bariatric surgery and also to prevent weight gain in the preoperative process.  patient declines initiating at this time.  Reviewed oral medications: Patient has no contraindications to Topamax Wellbutrin naltrexone, phentermine.  We discussed initiating Wellbutrin to help him with nicotine cravings and in order to decrease his snacking, however patient declines initiating any oral medication at this time          Nutrition:    Patient is working with bariatric surgical dietitian   He is trying to improve his snacking since he quit smoking and stay protein focused.  He attributes weight gain to the  excessive snacking      Physical Activity:   Has been walking 20 minutes 3 days a week on treadmill   Has been doing body weight exercises  2-3 days a week    Sleep: -  KATE on CPAP  Discussed indication of Zepbound for moderate to severe sleep apnea    Food Behaviors/Stress:   Patients main food behaviors are snacking and grazing grazing which is slowly improving by incorporating mindfulness.  Discussed having healthy low calorie foods on hand         Marcio was seen today for follow-up.    Diagnoses and all orders for this visit:    Class 3 severe obesity due to excess calories with serious comorbidity and body mass index (BMI) of 50.0 to 59.9 in adult            -In addition, please follow general recommendations below.          Return visit:  6-8 weeks        General Lifestyle recommendations:    Nutrition   -Avoid skipping meals. Avoid sugary beverages. At least 64oz of water daily.  Limit processed food, refined sugars and grain. Encourage  healthy choices for meals and snacks   -Focus on protein goals and non starchy fiber rich vegetables for satiety effect and to help support a calorie deficit.   - Emphasize portion control, well balanced macronutrient's (protein, carbohydrate, fat using MyPlate method )and adequate protein with each meal/snacks and distributing calories equally throughout the day along with.   -Advise starting the day with a protein breakfast   Behavioral/Stress   Food log via lashawn or provided paper log (lashawn options include www.VeriCenternesspal.com, sparkpeople.com, loseit.com, calorieking.com, PeopleMatter). Encouraged mindful eating. Be sure to set aside time to eat, eat slowly, and savor your food. Consider meditation apps and/or taking a few minutes of mindfulness every AM. Understand the role of regarding the role of stress hormone cortisol in promoting weight gain and visceral fat accumulation. Weigh daily or atleast 2-3 times/ week  Physical Activity   Increase physical activity by 10 minutes  "daily. Gradually increase physical activity to a goal of 5 days per week for 30 minutes of MODERATE intensity ( should be able to pass the \"talk test\" but should not be able to sing. Target 150-300 minutes  PLUS 2 days per week of FULL BODY resistance training. Progression will be addressed at follow up visits. Encouraged contemplation regarding establishing a daily physical activity routine  - Resistance training along with increase protein intake is important to maintain and enhance metabolism  Sleep   Encourage sleep hygiene and importance of having adequate sleep duration at least > 6 hours to support response in weight loss efforts    Handouts provided :  [facility]RIVE program at [facility]  MyPlate and food quality  Food log resources, phone lashawn or paper journal  Antiobesity medications options     - Discussed at length and the role of weight loss medications and medication options   - Explained the importance of making lifestyle changes in addition to starting any anti-obesity medications if the  patient chooses.  -  Initial weight loss goal of 5-10% weight loss for improved health  - Weight loss can improve patient's co-morbid conditions and/or prevent weight-related complications.  - Weight is not at goal and patient has been unable to achieve a meaningful weight loss above 5% using various programs and tools for more than 6 months    Marcio was seen today for follow-up.    Diagnoses and all orders for this visit:    Class 3 severe obesity due to excess calories with serious comorbidity and body mass index (BMI) of 50.0 to 59.9 in adult                        Total time spent reviewing chart, interviewing patient, examining patient, discussing plan, answering all questions, and documentin min, with >50% face-to-face time spent counseling patient on nonsurgical interventions for the treatment of excess weight. Discussed in detail nonsurgical options including intensive lifestyle intervention program, very " "low-calorie diet program and conservative program.  Discussed the role of weight loss medications.  Counseled patient on diet behavior and exercise modification for weight loss.        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 Lost it rapidly over 2 months and therefore did not keep it off for a long time. Was eating what his wife was craving during her pregnancy  Last summer - trying to portion control and healthier choices along with his wife who had the sleeve gastrectomy but was able to succeed in losing 20 lbs weight    Patient reports and other history-  He loves hunting and wants to get back into it.   Self referred, wife has had a sleeve  He knows family and friends who have had a   Wt Readings from Last 30 Encounters:   05/21/25 (!) 179 kg (394 lb 12.8 oz)   04/25/25 (!) 177 kg (390 lb 9.6 oz)   03/19/25 (!) 175 kg (385 lb 3.2 oz)   03/19/25 (!) 175 kg (385 lb 3.2 oz)   02/27/25 (!) 173 kg (381 lb 12.8 oz)   02/24/25 (!) 174 kg (384 lb)   02/21/25 (!) 175 kg (386 lb 6.4 oz)   01/13/25 (!) 170 kg (375 lb)   08/04/22 (!) 182 kg (401 lb)   07/08/22 (!) 186 kg (410 lb)   08/19/21 (!) 178 kg (392 lb)   08/17/21 (!) 179 kg (394 lb)   04/12/21 (!) 161 kg (355 lb)   09/21/20 (!) 172 kg (380 lb)   01/07/20 (!) 159 kg (350 lb)   07/17/19 (!) 170 kg (375 lb)   04/16/19 (!) 171 kg (377 lb)   03/07/19 (!) 170 kg (374 lb)   08/20/18 (!) 147 kg (325 lb)   06/29/17 (!) 166 kg (365 lb 15.9 oz)    BMI Readings from Last 30 Encounters:   05/21/25 55.06 kg/m²   04/25/25 54.48 kg/m²   03/19/25 53.72 kg/m²   03/19/25 53.72 kg/m²   02/27/25 53.25 kg/m²   02/24/25 53.94 kg/m² "   02/21/25 54.27 kg/m²   01/13/25 52.30 kg/m²   08/04/22 52.91 kg/m²   07/08/22 52.89 kg/m²   08/19/21 53.16 kg/m²   08/17/21 53.44 kg/m²   04/12/21 48.15 kg/m²   09/21/20 51.54 kg/m²   01/07/20 47.47 kg/m²   07/17/19 50.86 kg/m²   04/16/19 51.13 kg/m²   03/07/19 50.72 kg/m²   08/20/18 44.08 kg/m²   06/29/17 49.64 kg/m²                   Lifestyle questionnaire         Diet recall:  B: Premier protein shake coffee or oatmeal  S: PB crackers and pretzels  L: Low carb wrap with lunch meat  S:  D: P/V/S- 6 PM  S: none    Frequency Eating out x/ week: 1 x/ 2week    Food behaviors--struggle controlling portions , sometimes unhealthy     Trouble area of the day: larger dinner    Beverages  Water--64  oz   Caffeine/tea--  12 oz   SSB -- none    Alcohol: 0 drinks/ week   Smoking: chewing tobacco- 15 years   Drug use: no    Social History     Substance and Sexual Activity   Alcohol Use Never      Social History     Tobacco Use   Smoking Status Never   Smokeless Tobacco Current    Types: Chew   Tobacco Comments    Pt chews daily      Social History     Substance and Sexual Activity   Drug Use Never         Physical Activity --walking while on hunting trips     Sleep -- KATE on CPAP  ; 6-8 hours of sleep per night    Occupation-works as a  for the [employer]    Psycho social- lives with wife 2 kids 11, 5      Start date: 2/21/2025   Intial weight on 2/21/2025 : (!) 175 kg (386 lb 6.4 oz)    on 2/21/2025 :Body mass index is 54.27 kg/m².  Obesity Class: Above 40- Obesity Class III  Goal weight: 250 lbs    Weight on 5/21/2025 :(!) 179 kg (394 lb 12.8 oz)(+8)  BMI on  5/21/2025 : Body mass index is 55.06 kg/m². Above 40- Obesity Class III  Difference: +8 lbs      Anti obesity Medications/ Medical---    Weight loss medication and dose: None  Date initiated:        Waist circumference (inches): 60 Inches      Waist circumference (inches): 61.5 Inches        Medication considerations/contraindications     -Patient denies  "personal history of pancreatitis. Patient also denies personal and family history of medullary thyroid cancer, and multiple endocrine neoplasia type 2 (MEN 2 tumor). -Patient denies any history of kidney stones, seizures, or glaucoma, diabetic retinopathy, gall bladder disease, gastroparesis, hyperthyroidism.  -Denies Hx of CAD, PAD, palpitations, arrhythmia, uncontrolled hypertension  -Denies uncontrolled anxiety or depression, suicidal behavior or thinking , insomnia or sleep disturbance.         Past medical history/past surgical history       Previous notes and records have been reviewed.    The following portions of the patient's history were reviewed and updated as appropriate: allergies, current medications, past family history, past medical history, past social history, past surgical history, and problem list.    Past Medical History:   Diagnosis Date    Acute left-sided low back pain with left-sided sciatica 9/21/2020    Acute swimmer's ear of left side 8/20/2018    Impacted cerumen of left ear 9/24/2015    Laceration of right knee 3/7/2019         Past Surgical History:   Procedure Laterality Date    TONSILLECTOMY Bilateral 01/2000             Family History   Problem Relation Age of Onset    Hypertension Father         Essential hypertension     Anxiety disorder Mother             Objective     /80 (Patient Position: Sitting, Cuff Size: Large)   Pulse 69   Ht 5' 11\" (1.803 m)   Wt (!) 179 kg (394 lb 12.8 oz)   BMI 55.06 kg/m²     Review of Systems   Constitutional:  Negative for chills, fatigue and fever.   HENT:  Negative for ear pain and sore throat.    Eyes:  Negative for pain and visual disturbance.   Respiratory:  Negative for cough and shortness of breath.    Cardiovascular:  Negative for chest pain, palpitations and leg swelling.   Gastrointestinal:  Negative for abdominal pain, constipation, diarrhea, nausea and vomiting.   Genitourinary:  Negative for dysuria and hematuria. " "  Musculoskeletal:  Negative for arthralgias and back pain.   Skin:  Negative for color change and rash.   Neurological:  Negative for seizures, syncope and headaches.   Psychiatric/Behavioral:  Negative for dysphoric mood. The patient is not nervous/anxious.    All other systems reviewed and are negative.    Physical Exam  Vitals and nursing note reviewed.   Constitutional:       Appearance: Normal appearance.   HENT:      Head: Normocephalic.   Pulmonary:      Effort: Pulmonary effort is normal.   Neurological:      General: No focal deficit present.      Mental Status: He is alert and oriented to person, place, and time.   Psychiatric:         Mood and Affect: Mood normal.         Behavior: Behavior normal.         Thought Content: Thought content normal.         Judgment: Judgment normal.         Medications     No current outpatient medications on file.           Labs and imaging     Recent labs and imaging have been personally reviewed.  Lab Results   Component Value Date    WBC 8.75 05/06/2025    HGB 15.9 05/06/2025    HCT 47.5 05/06/2025    MCV 87 05/06/2025     05/06/2025     Lab Results   Component Value Date    SODIUM 139 05/06/2025    K 3.8 05/06/2025     05/06/2025    CO2 28 05/06/2025    AGAP 7 05/06/2025    BUN 16 05/06/2025    CREATININE 0.73 05/06/2025    GLUF 90 05/06/2025    CALCIUM 9.1 05/06/2025    AST 16 05/06/2025    ALT 29 05/06/2025    ALKPHOS 93 05/06/2025    TP 6.8 05/06/2025    TBILI 0.44 05/06/2025    EGFR 122 05/06/2025     Lab Results   Component Value Date    HGBA1C 5.2 05/06/2025     Lab Results   Component Value Date    UWC1IWJPCWOM 2.541 05/06/2025     Lab Results   Component Value Date    CHOLESTEROL 121 05/06/2025     Lab Results   Component Value Date    HDL 40 05/06/2025     Lab Results   Component Value Date    TRIG 132 05/06/2025     Lab Results   Component Value Date    LDLCALC 55 05/06/2025     No results found for: \"[id number]\", \"LABINSU\"                  "

## 2025-05-21 NOTE — ASSESSMENT & PLAN NOTE
ntiobesity Medications/Medical -- Class 3 obesity with co morbidities of KATE   Patient currently the surgical pathway for Mami-en-Y gastric bypass and is here for his weight check  Since last office visit patient has gained 9 pounds.  Patient states this is secondary to him snacking and grazing more since he quit smoking 2 months ago as a requirement for surgical process.  Discussed GLP-1 medications such as Wegovy and Zepbound.  Requested patient inquire with his insurance regarding coverage. (Per EMR possibly Tier  2 of 3)  We discussed how antiobesity medications could help him optimize his weight prior to his bariatric surgery and also to prevent weight gain in the preoperative process.  patient declines initiating at this time.  Reviewed oral medications: Patient has no contraindications to Topamax Wellbutrin naltrexone, phentermine.  We discussed initiating Wellbutrin to help him with nicotine cravings and in order to decrease his snacking, however patient declines initiating any oral medication at this time          Nutrition:    Patient is working with bariatric surgical dietitian   He is trying to improve his snacking since he quit smoking and stay protein focused.  He attributes weight gain to the excessive snacking      Physical Activity:   Has been walking 20 minutes 3 days a week on treadmill   Has been doing body weight exercises  2-3 days a week    Sleep: -  KATE on CPAP  Discussed indication of Zepbound for moderate to severe sleep apnea    Food Behaviors/Stress:   Patients main food behaviors are snacking and grazing grazing which is slowly improving by incorporating mindfulness.  Discussed having healthy low calorie foods on hand

## 2025-06-06 ENCOUNTER — TELEPHONE (OUTPATIENT)
Dept: BARIATRICS | Facility: CLINIC | Age: 35
End: 2025-06-06

## 2025-06-06 NOTE — TELEPHONE ENCOUNTER
Called and accessed patient insurance for a prior authorization on an  EGD for DOS on 06/20/2025 w/   , no auth. Is required.

## 2025-06-19 RX ORDER — SODIUM CHLORIDE, SODIUM LACTATE, POTASSIUM CHLORIDE, CALCIUM CHLORIDE 600; 310; 30; 20 MG/100ML; MG/100ML; MG/100ML; MG/100ML
125 INJECTION, SOLUTION INTRAVENOUS CONTINUOUS
Status: CANCELLED | OUTPATIENT
Start: 2025-06-19

## 2025-06-20 ENCOUNTER — ANESTHESIA (OUTPATIENT)
Dept: PERIOP | Facility: HOSPITAL | Age: 35
End: 2025-06-20
Payer: COMMERCIAL

## 2025-06-20 ENCOUNTER — HOSPITAL ENCOUNTER (OUTPATIENT)
Dept: PERIOP | Facility: HOSPITAL | Age: 35
Setting detail: OUTPATIENT SURGERY
End: 2025-06-20
Attending: SURGERY
Payer: COMMERCIAL

## 2025-06-20 ENCOUNTER — ANESTHESIA EVENT (OUTPATIENT)
Dept: PERIOP | Facility: HOSPITAL | Age: 35
End: 2025-06-20
Payer: COMMERCIAL

## 2025-06-20 ENCOUNTER — CLINICAL SUPPORT (OUTPATIENT)
Dept: BARIATRICS | Facility: CLINIC | Age: 35
End: 2025-06-20

## 2025-06-20 VITALS — WEIGHT: 315 LBS | BODY MASS INDEX: 55.18 KG/M2

## 2025-06-20 VITALS
RESPIRATION RATE: 16 BRPM | TEMPERATURE: 98 F | BODY MASS INDEX: 44.1 KG/M2 | HEIGHT: 71 IN | SYSTOLIC BLOOD PRESSURE: 129 MMHG | WEIGHT: 315 LBS | DIASTOLIC BLOOD PRESSURE: 70 MMHG | OXYGEN SATURATION: 96 % | HEART RATE: 64 BPM

## 2025-06-20 DIAGNOSIS — E66.01 MORBID OBESITY (HCC): ICD-10-CM

## 2025-06-20 DIAGNOSIS — E66.813 CLASS 3 SEVERE OBESITY DUE TO EXCESS CALORIES WITH SERIOUS COMORBIDITY AND BODY MASS INDEX (BMI) OF 50.0 TO 59.9 IN ADULT: Primary | ICD-10-CM

## 2025-06-20 PROCEDURE — 88305 TISSUE EXAM BY PATHOLOGIST: CPT | Performed by: PATHOLOGY

## 2025-06-20 PROCEDURE — RECHECK

## 2025-06-20 PROCEDURE — 43239 EGD BIOPSY SINGLE/MULTIPLE: CPT | Performed by: SURGERY

## 2025-06-20 RX ORDER — ONDANSETRON 2 MG/ML
4 INJECTION INTRAMUSCULAR; INTRAVENOUS ONCE AS NEEDED
Status: CANCELLED | OUTPATIENT
Start: 2025-06-20

## 2025-06-20 RX ORDER — SODIUM CHLORIDE, SODIUM LACTATE, POTASSIUM CHLORIDE, CALCIUM CHLORIDE 600; 310; 30; 20 MG/100ML; MG/100ML; MG/100ML; MG/100ML
125 INJECTION, SOLUTION INTRAVENOUS CONTINUOUS
Status: DISCONTINUED | OUTPATIENT
Start: 2025-06-20 | End: 2025-06-24 | Stop reason: HOSPADM

## 2025-06-20 RX ORDER — LIDOCAINE HYDROCHLORIDE 10 MG/ML
INJECTION, SOLUTION EPIDURAL; INFILTRATION; INTRACAUDAL; PERINEURAL AS NEEDED
Status: DISCONTINUED | OUTPATIENT
Start: 2025-06-20 | End: 2025-06-20

## 2025-06-20 RX ORDER — GLYCOPYRROLATE 0.2 MG/ML
INJECTION INTRAMUSCULAR; INTRAVENOUS AS NEEDED
Status: DISCONTINUED | OUTPATIENT
Start: 2025-06-20 | End: 2025-06-20

## 2025-06-20 RX ORDER — PROPOFOL 10 MG/ML
INJECTION, EMULSION INTRAVENOUS AS NEEDED
Status: DISCONTINUED | OUTPATIENT
Start: 2025-06-20 | End: 2025-06-20

## 2025-06-20 RX ADMIN — SODIUM CHLORIDE, SODIUM LACTATE, POTASSIUM CHLORIDE, AND CALCIUM CHLORIDE 125 ML/HR: .6; .31; .03; .02 INJECTION, SOLUTION INTRAVENOUS at 09:48

## 2025-06-20 RX ADMIN — PROPOFOL 100 MG: 10 INJECTION, EMULSION INTRAVENOUS at 10:55

## 2025-06-20 RX ADMIN — GLYCOPYRROLATE 0.2 MCG: 0.2 INJECTION, SOLUTION INTRAMUSCULAR; INTRAVENOUS at 10:48

## 2025-06-20 RX ADMIN — PROPOFOL 150 MG: 10 INJECTION, EMULSION INTRAVENOUS at 10:51

## 2025-06-20 RX ADMIN — LIDOCAINE HYDROCHLORIDE 100 MG: 10 INJECTION, SOLUTION EPIDURAL; INFILTRATION; INTRACAUDAL; PERINEURAL at 10:51

## 2025-06-20 NOTE — PROGRESS NOTES
Patient presents for 5 of 6 weight check, current weight 395.6lbs.    Eating behaviors/food choices: Patient reports meal prepping and planning, unsure why weight loss is not occurring. Patient calorie intake is lighter during the day, dinner tends to be the heavier meal. Encouraged patient to consider flipping this set up, getting more nutrients throughout the day and less into the evening. Patient is doing well in getting his hydration. Discussed some differences in sensations while eating after surgery, possibly for nausea, benefit of eating slowly and taking small bites. Patient continues to try to slow down eating, he has been having his salad at lunch with chopsticks.    Activity/Exercise:  With patient's new job he's getting in a lot of walking, he estimates about 10K steps. Patient's job is a very active one, he is lifting and transferring a lot. Discussed time off to adequately heal before turning to physical labor.    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 He estimates getting about 6-8hrs per night, he feels well rested, he is using his CPAP machine.     Mental Health/Wellness:  Patient reports continued stress with building his new home and acclimating to a new job. He feels he handles it well, he reports not letting much bother him but still may not be recognizing how stress is impacting his health and weight loss. Reviewed the impact of stress hormones, not to underestimate the part they may play in his weight management. Encouraged to use effective coping skills.    Workflow review:    Labs and PCP: both done  Psych and EGD: no eval needed, EGD done  Nicotine: negative nicotine test obtained  Cardiology: scheduled 6/30  Sleep: uses CPAP  Weight Checks: 6     Goals:    - consider redistributing calories to earlier in the day  - continue efforts to be active and get quality sleep  - consider stress level and impact on weight management, use effective coping  skills.    Next Appointment: with RD on 7/22

## 2025-06-20 NOTE — ANESTHESIA POSTPROCEDURE EVALUATION
Post-Op Assessment Note    CV Status:  Stable  Pain Score: 0    Pain management: adequate       Mental Status:  Awake   Hydration Status:  Stable   PONV Controlled:  None   Airway Patency:  Patent     Post Op Vitals Reviewed: Yes    No anethesia notable event occurred.    Staff: Anesthesiologist, CRNA           Last Filed PACU Vitals:  Vitals Value Taken Time   Temp     Pulse 82 06/20/25 11:04   /55 06/20/25 11:04   Resp 16 06/20/25 11:04   SpO2 96 % 06/20/25 11:04       Modified Andre:     Vitals Value Taken Time   Activity 2 06/20/25 11:04   Respiration 2 06/20/25 11:04   Circulation 2 06/20/25 11:04   Consciousness 2 06/20/25 11:04   Oxygen Saturation 2 06/20/25 11:04     Modified Andre Score: 10

## 2025-06-20 NOTE — H&P
"This is a 34 y.o. male with a history of morbid obesity and Body mass index is 54.82 kg/m².  Here for an EGD to evaluate the anatomy of the GI tract and to rule out the presence of H. pylori.    Physical Exam    /62   Pulse 71   Temp 98 °F (36.7 °C) (Temporal)   Resp 20   Ht 5' 11\" (1.803 m)   Wt (!) 178 kg (393 lb 1.3 oz)   SpO2 98%   BMI 54.82 kg/m²    AAOx3  RRR  CTA B  Abdomen obese. Benign.  Extremities warm and dry       A/P:    This is a 34 y.o. male with a history of morbid obesity and Body mass index is 54.82 kg/m²..    Will proceed with the EGD and biopsies.      Vasiliy Velazquez MD  6/20/2025  10:02 AM         "

## 2025-06-20 NOTE — PERIOPERATIVE NURSING NOTE
Patient received from procedure team( RN and CRNA), vitals stable. bed is locked and in lowest position , side rails up . Call bell within reach. Plan of care in progress

## 2025-06-25 PROCEDURE — 88305 TISSUE EXAM BY PATHOLOGIST: CPT | Performed by: PATHOLOGY

## 2025-07-14 ENCOUNTER — OFFICE VISIT (OUTPATIENT)
Dept: CARDIOLOGY CLINIC | Facility: CLINIC | Age: 35
End: 2025-07-14
Attending: SURGERY
Payer: COMMERCIAL

## 2025-07-14 VITALS
WEIGHT: 315 LBS | HEIGHT: 71 IN | SYSTOLIC BLOOD PRESSURE: 122 MMHG | DIASTOLIC BLOOD PRESSURE: 82 MMHG | OXYGEN SATURATION: 98 % | BODY MASS INDEX: 44.1 KG/M2

## 2025-07-14 DIAGNOSIS — E66.01 MORBID OBESITY WITH BMI OF 50.0-59.9, ADULT (HCC): ICD-10-CM

## 2025-07-14 DIAGNOSIS — Z01.818 PRE-OP EXAM: Primary | ICD-10-CM

## 2025-07-14 DIAGNOSIS — G47.33 OSA (OBSTRUCTIVE SLEEP APNEA): ICD-10-CM

## 2025-07-14 PROCEDURE — 93000 ELECTROCARDIOGRAM COMPLETE: CPT | Performed by: STUDENT IN AN ORGANIZED HEALTH CARE EDUCATION/TRAINING PROGRAM

## 2025-07-14 PROCEDURE — 99244 OFF/OP CNSLTJ NEW/EST MOD 40: CPT | Performed by: STUDENT IN AN ORGANIZED HEALTH CARE EDUCATION/TRAINING PROGRAM

## 2025-07-14 NOTE — PROGRESS NOTES
"Advanced Heart Failure/Pulmonary Hypertension Outpatient Note - Marcio Ponce 34 y.o. male MRN: [id number]    @ Encounter: [id number]      Assessment:  34 y.o. male PMH and acute problems listed later in this note (a partial list may also be included within 'assessment' section) presents for consultation.  I first met Marcio Ponce on 7/14/25.  Referred by Vasiliy Velazquez MD.     Problem List[1]      Today I have reviewed all pertinent labs/imaging/data including but not limited to:        Lab Units 05/06/25  0709 01/16/25  0621   CREATININE mg/dL 0.73 0.89         Lab Results   Component Value Date    K 3.8 05/06/2025     Lab Results   Component Value Date    HGBA1C 5.2 05/06/2025     Lab Results   Component Value Date    HFX7QZGIHFNJ 2.541 05/06/2025     Lab Results   Component Value Date    LDLCALC 55 05/06/2025     No results found for: \"BNP\"   No results found for: \"NTBNP\"       TODAY'S PLAN:     07/14/25  I am meeting patient for the first time today  Warm, euvolemic  No new cardiac complaints, feels generally well  ECG wnl    Cardiac pre op evaluation:  - the patient is low risk for non-cardiac surgery  - the patient likely can complete 4 mets exertion  - no evidence of acute cardiac issue including decompensated HF, uncontrolled arrhythmia, severe symptomatic valvulopathy, or an ACS  - the patient is currently optimized from cardiac perspective  - no further cardiac testing planned at this time  - continue current home medications with any edu-op modifications per anesthesia/surgery teams    Advise weight loss, exercise    Follow up:  With the general cardiology team in 12 months. This patient does not have advanced heart failure needs at present.  In addition to follow up with their other medical providers    Studies:  Today I have reviewed all pertinent patient data/labs/imaging where available, including but not limited to the below studies. This includes my independent interpretation. " Selected results may be displayed here but comprehensive listing is omitted for note clarity and can be found in the epic chart.    ECG.    Echo.    Stress.    Cath.    HPI:   34 y.o. male PMH and acute problems listed later in this note (a partial list may also be included within 'assessment' section) presents for consultation.  No new CP/SOB/dizziness/palpitations/syncope.  No new fatigue.  No new unintentional weight changes.  No new leg swelling, PND, pillow orthopnea.  No new fevers, chills, cough, nausea, vomiting, diarrhea, dysuria.      ROS:  10 point ROS negative except as specified in HPI    Past Medical History[2]  Problem List[3]  No current facility-administered medications for this visit.    No current outpatient medications   Allergies[4]  Social History     Socioeconomic History    Marital status: /Civil Union     Spouse name: Not on file    Number of children: Not on file    Years of education: Not on file    Highest education level: Not on file   Occupational History    Not on file   Tobacco Use    Smoking status: Never    Smokeless tobacco: Current     Types: Chew    Tobacco comments:     Pt chews daily   Vaping Use    Vaping status: Never Used   Substance and Sexual Activity    Alcohol use: Never    Drug use: Never    Sexual activity: Yes   Other Topics Concern    Not on file   Social History Narrative    Former smoker - As per [employer]      Social Drivers of Health     Financial Resource Strain: Not on file   Food Insecurity: No Food Insecurity (6/20/2025)    Nursing - Inadequate Food Risk Classification     Worried About Running Out of Food in the Last Year: Not on file     Ran Out of Food in the Last Year: Not on file     Ran Out of Food in the Last Year: Never true   Transportation Needs: No Transportation Needs (6/20/2025)    Nursing - Transportation Risk Classification     Lack of Transportation: Not on file     Lack of Transportation: No   Physical Activity: Not on file   Stress:  "Not on file   Social Connections: Not on file   Intimate Partner Violence: Unknown (6/20/2025)    Nursing IPS     Feels Physically and Emotionally Safe: Not on file     Physically Hurt by Someone: Not on file     Humiliated or Emotionally Abused by Someone: Not on file     Physically Hurt by Someone: No     Hurt or Threatened by Someone: No   Housing Stability: Not on file     Family History[5]    Physical Exam:  Vitals:    07/14/25 1341   BP: 122/82   BP Location: Left arm   Patient Position: Sitting   Cuff Size: Large   SpO2: 98%   Weight: (!) 183 kg (403 lb 6.4 oz)   Height: 5' 11\" (1.803 m)     Constitutional: NAD, non toxic, obese  Ears/nose/mouth/throat: atraumatic  CV: RRR, nl S1S2, no murmurs/rubs/gallups, no JVD, no HJR  Resp: CTABL  GI: Soft, NTND  MSK: no swollen joints in exposed areas  Extr: No edema, warm LE  Pysche: Normal affect  Neuro: appropriate in conversation  Skin: dry and intact in exposed areas    Labs & Results:  Lab Results   Component Value Date    WBC 8.75 05/06/2025    HGB 15.9 05/06/2025    HCT 47.5 05/06/2025    MCV 87 05/06/2025     05/06/2025     Lab Results   Component Value Date    SODIUM 139 05/06/2025    K 3.8 05/06/2025     05/06/2025    CO2 28 05/06/2025    BUN 16 05/06/2025    CREATININE 0.73 05/06/2025    CALCIUM 9.1 05/06/2025       Counseling / Coordination of Care  Greater than 50% of total time was spent with the patient and / or family counseling and / or coordination of care. Discussion included diagnoses, most recent studies and any changes in treatment.    Thank you for the opportunity to participate in the care of this patient.    Feliciano Falcon MD  Attending Physician  Advanced Heart Failure Cardiology  [facility]         [1]   Patient Active Problem List  Diagnosis    BMI 50.0-59.9, adult (HCC)    KATE (obstructive sleep apnea)    Supraspinatus tendinitis, right    Class 3 severe obesity due to excess calories with serious " comorbidity and body mass index (BMI) of 50.0 to 59.9 in adult   [2]   Past Medical History:  Diagnosis Date    Acute left-sided low back pain with left-sided sciatica 9/21/2020    Acute swimmer's ear of left side 8/20/2018    Impacted cerumen of left ear 9/24/2015    Laceration of right knee 3/7/2019   [3]   Patient Active Problem List  Diagnosis    BMI 50.0-59.9, adult (HCC)    KATE (obstructive sleep apnea)    Supraspinatus tendinitis, right    Class 3 severe obesity due to excess calories with serious comorbidity and body mass index (BMI) of 50.0 to 59.9 in adult   [4] No Known Allergies  [5]   Family History  Problem Relation Name Age of Onset    Hypertension Father          Essential hypertension     Anxiety disorder Mother

## 2025-07-14 NOTE — LETTER
"July 14, 2025     Vasiliy Velazquez MD  [address] [address]    Patient: Marcio Ponce   YOB: 1990   Date of Visit: 7/14/2025       Dear Dr. Vasiliy Velazquez MD:    Thank you for referring Marcio Ponce to me for evaluation. Below are my notes for this consultation.    If you have questions, please do not hesitate to call me. I look forward to following your patient along with you.         Sincerely,        Feliciano Falcon MD        CC: No Recipients    Feliciano Falcon MD  7/14/2025  2:16 PM  Sign when Signing Visit  Advanced Heart Failure/Pulmonary Hypertension Outpatient Note - Marcio Ponce 34 y.o. male MRN: [id number]    @ Encounter: [id number]      Assessment:  34 y.o. male PMH and acute problems listed later in this note (a partial list may also be included within 'assessment' section) presents for consultation.  I first met Marcio Ponce on 7/14/25.  Referred by Vasiliy Velazquez MD.     Problem List[1]      Today I have reviewed all pertinent labs/imaging/data including but not limited to:        Lab Units 05/06/25  0709 01/16/25  0621   CREATININE mg/dL 0.73 0.89         Lab Results   Component Value Date    K 3.8 05/06/2025     Lab Results   Component Value Date    HGBA1C 5.2 05/06/2025     Lab Results   Component Value Date    [id number] 2.541 05/06/2025     Lab Results   Component Value Date    LDLCALC 55 05/06/2025     No results found for: \"BNP\"   No results found for: \"NTBNP\"       TODAY'S PLAN:     07/14/25  I am meeting patient for the first time today  Warm, euvolemic  No new cardiac complaints, feels generally well  ECG wnl    Cardiac pre op evaluation:  - the patient is low risk for non-cardiac surgery  - the patient likely can complete 4 mets exertion  - no evidence of acute cardiac issue including decompensated HF, uncontrolled arrhythmia, severe symptomatic valvulopathy, or an ACS  - the patient is currently optimized from cardiac " perspective  - no further cardiac testing planned at this time  - continue current home medications with any edu-op modifications per anesthesia/surgery teams    Advise weight loss, exercise    Follow up:  With the general cardiology team in 12 months. This patient does not have advanced heart failure needs at present.  In addition to follow up with their other medical providers    Studies:  Today I have reviewed all pertinent patient data/labs/imaging where available, including but not limited to the below studies. This includes my independent interpretation. Selected results may be displayed here but comprehensive listing is omitted for note clarity and can be found in the epic chart.    ECG.    Echo.    Stress.    Cath.    HPI:   34 y.o. male PMH and acute problems listed later in this note (a partial list may also be included within 'assessment' section) presents for consultation.  No new CP/SOB/dizziness/palpitations/syncope.  No new fatigue.  No new unintentional weight changes.  No new leg swelling, PND, pillow orthopnea.  No new fevers, chills, cough, nausea, vomiting, diarrhea, dysuria.      ROS:  10 point ROS negative except as specified in HPI    Past Medical History[2]  Problem List[3]  No current facility-administered medications for this visit.    No current outpatient medications   Allergies[4]  Social History     Socioeconomic History   • Marital status: /Civil Union     Spouse name: Not on file   • Number of children: Not on file   • Years of education: Not on file   • Highest education level: Not on file   Occupational History   • Not on file   Tobacco Use   • Smoking status: Never   • Smokeless tobacco: Current     Types: Chew   • Tobacco comments:     Pt chews daily   Vaping Use   • Vaping status: Never Used   Substance and Sexual Activity   • Alcohol use: Never   • Drug use: Never   • Sexual activity: Yes   Other Topics Concern   • Not on file   Social History Narrative    Former smoker  "- As per [employer]      Social Drivers of Health     Financial Resource Strain: Not on file   Food Insecurity: No Food Insecurity (6/20/2025)    Nursing - Inadequate Food Risk Classification    • Worried About Running Out of Food in the Last Year: Not on file    • Ran Out of Food in the Last Year: Not on file    • Ran Out of Food in the Last Year: Never true   Transportation Needs: No Transportation Needs (6/20/2025)    Nursing - Transportation Risk Classification    • Lack of Transportation: Not on file    • Lack of Transportation: No   Physical Activity: Not on file   Stress: Not on file   Social Connections: Not on file   Intimate Partner Violence: Unknown (6/20/2025)    Nursing IPS    • Feels Physically and Emotionally Safe: Not on file    • Physically Hurt by Someone: Not on file    • Humiliated or Emotionally Abused by Someone: Not on file    • Physically Hurt by Someone: No    • Hurt or Threatened by Someone: No   Housing Stability: Not on file     Family History[5]    Physical Exam:  Vitals:    07/14/25 1341   BP: 122/82   BP Location: Left arm   Patient Position: Sitting   Cuff Size: Large   SpO2: 98%   Weight: (!) 183 kg (403 lb 6.4 oz)   Height: 5' 11\" (1.803 m)     Constitutional: NAD, non toxic, obese  Ears/nose/mouth/throat: atraumatic  CV: RRR, nl S1S2, no murmurs/rubs/gallups, no JVD, no HJR  Resp: CTABL  GI: Soft, NTND  MSK: no swollen joints in exposed areas  Extr: No edema, warm LE  Pysche: Normal affect  Neuro: appropriate in conversation  Skin: dry and intact in exposed areas    Labs & Results:  Lab Results   Component Value Date    WBC 8.75 05/06/2025    HGB 15.9 05/06/2025    HCT 47.5 05/06/2025    MCV 87 05/06/2025     05/06/2025     Lab Results   Component Value Date    SODIUM 139 05/06/2025    K 3.8 05/06/2025     05/06/2025    CO2 28 05/06/2025    BUN 16 05/06/2025    CREATININE 0.73 05/06/2025    CALCIUM 9.1 05/06/2025       Counseling / Coordination of Care  Greater than " 50% of total time was spent with the patient and / or family counseling and / or coordination of care. Discussion included diagnoses, most recent studies and any changes in treatment.    Thank you for the opportunity to participate in the care of this patient.    Feliciano Falcon MD  Attending Physician  Advanced Heart Failure Cardiology  [facility]         [1]   Patient Active Problem List  Diagnosis   • BMI 50.0-59.9, adult (HCC)   • KATE (obstructive sleep apnea)   • Supraspinatus tendinitis, right   • Class 3 severe obesity due to excess calories with serious comorbidity and body mass index (BMI) of 50.0 to 59.9 in adult   [2]   Past Medical History:  Diagnosis Date   • Acute left-sided low back pain with left-sided sciatica 9/21/2020   • Acute swimmer's ear of left side 8/20/2018   • Impacted cerumen of left ear 9/24/2015   • Laceration of right knee 3/7/2019   [3]   Patient Active Problem List  Diagnosis   • BMI 50.0-59.9, adult (HCC)   • KATE (obstructive sleep apnea)   • Supraspinatus tendinitis, right   • Class 3 severe obesity due to excess calories with serious comorbidity and body mass index (BMI) of 50.0 to 59.9 in adult   [4] No Known Allergies  [5]   Family History  Problem Relation Name Age of Onset   • Hypertension Father          Essential hypertension    • Anxiety disorder Mother

## 2025-07-24 ENCOUNTER — TELEPHONE (OUTPATIENT)
Dept: BARIATRICS | Facility: CLINIC | Age: 35
End: 2025-07-24

## 2025-07-24 NOTE — TELEPHONE ENCOUNTER
Spoke with Stacia SAM reviewer and was advised that she could either send case to her medical director and receive a denial or that this [id number] case would be cancelled and forwarded to the OP team. I asked her to send the file to her OP team for review and that the case would then be assigned a new auth #. The reference for this call would be the original case # of [id number]

## 2025-07-25 ENCOUNTER — CLINICAL SUPPORT (OUTPATIENT)
Dept: BARIATRICS | Facility: CLINIC | Age: 35
End: 2025-07-25

## 2025-07-25 VITALS — HEIGHT: 71 IN | WEIGHT: 315 LBS | BODY MASS INDEX: 44.1 KG/M2

## 2025-07-25 DIAGNOSIS — E66.01 MORBID (SEVERE) OBESITY DUE TO EXCESS CALORIES (HCC): Primary | ICD-10-CM

## 2025-07-25 PROCEDURE — RECHECK

## 2025-07-25 NOTE — PROGRESS NOTES
Pt attended pre-op education session. Standardized packet of information for bariatric surgery was provided and reviewed with pt. Importance of lifestyle change and development of regular exercise routine stressed.   Pt. educated on two-week pre operative liquid protein liver shrinking diet.  Pt understands that the diet needs to be followed for 2 weeks prior to surgery. Handout reviewed.   Emphasized the need to drink 80 ounces of fluid per day while on the diet and to contact PCP to adjust any diabetes or blood pressure medicines prior to starting the diet.  Patient will not eat any solid food for 2 days prior to surgery, including 2 cups of non starchy vegetables to ensure that the stomach will be empty day of surgery. Reviewed Ensure pre-surgery ERAS drink instructions, protein supplement suggestions, post-operative clear liquid, full liquid, and pureed diet stages, post-operative nutrition rules and facts, and post-operative bariatric multivitamin/mineral recommendations and brand comparison. Reviewed instructions for stopping or tapering anti-obesity medications prior to surgery.      Pt given the opportunity to ask questions. Questions were answered. Pt verbalized understanding of all information provided. Pt appeared prepared for upcoming surgery. Contact information provided for any questions/concerns.      Sx is tentatively scheduled for August 18th. If so recommended pt start pre-op diet this weekend and continue on for 3 weeks for best weight loss results. Also provided with a list of keto snacks and advised to add 200 cals of the snacks to better meet [id number] cals per day, in addition to usual pre-op liver shrinking diet, in order to promote most wt loss. Handouts provided. Pt verbalizes understanding.

## 2025-08-12 ENCOUNTER — PREP FOR PROCEDURE (OUTPATIENT)
Dept: BARIATRICS | Facility: CLINIC | Age: 35
End: 2025-08-12

## 2025-08-12 ENCOUNTER — APPOINTMENT (OUTPATIENT)
Dept: LAB | Facility: CLINIC | Age: 35
End: 2025-08-12
Payer: COMMERCIAL

## 2025-08-15 ENCOUNTER — OFFICE VISIT (OUTPATIENT)
Dept: BARIATRICS | Facility: CLINIC | Age: 35
End: 2025-08-15

## 2025-08-16 DIAGNOSIS — Z98.84 BARIATRIC SURGERY STATUS: ICD-10-CM

## 2025-08-18 ENCOUNTER — HOSPITAL ENCOUNTER (OUTPATIENT)
Facility: HOSPITAL | Age: 35
Setting detail: OUTPATIENT SURGERY
Discharge: HOME | End: 2025-08-19
Attending: SURGERY | Admitting: SURGERY
Payer: COMMERCIAL

## 2025-08-18 ENCOUNTER — TELEPHONE (OUTPATIENT)
Age: 35
End: 2025-08-18

## 2025-08-18 ENCOUNTER — ANESTHESIA (OUTPATIENT)
Dept: PERIOP | Facility: HOSPITAL | Age: 35
End: 2025-08-18
Payer: COMMERCIAL

## 2025-08-18 ENCOUNTER — ANESTHESIA EVENT (OUTPATIENT)
Dept: PERIOP | Facility: HOSPITAL | Age: 35
End: 2025-08-18
Payer: COMMERCIAL

## 2025-08-18 DIAGNOSIS — Z98.84 S/P GASTRIC BYPASS: ICD-10-CM

## 2025-08-18 DIAGNOSIS — L76.82 INCISIONAL PAIN: Primary | ICD-10-CM

## 2025-08-18 LAB — GLUCOSE SERPL-MCNC: 72 MG/DL (ref 65–140)

## 2025-08-18 PROCEDURE — 82948 REAGENT STRIP/BLOOD GLUCOSE: CPT

## 2025-08-18 PROCEDURE — 43644 LAP GASTRIC BYPASS/ROUX-EN-Y: CPT | Performed by: PHYSICIAN ASSISTANT

## 2025-08-18 PROCEDURE — 94760 N-INVAS EAR/PLS OXIMETRY 1: CPT

## 2025-08-18 RX ORDER — LIDOCAINE HYDROCHLORIDE 10 MG/ML
INJECTION, SOLUTION EPIDURAL; INFILTRATION; INTRACAUDAL; PERINEURAL AS NEEDED
Status: DISCONTINUED | OUTPATIENT
Start: 2025-08-18 | End: 2025-08-18

## 2025-08-18 RX ORDER — DEXAMETHASONE SODIUM PHOSPHATE 10 MG/ML
INJECTION, SOLUTION INTRAMUSCULAR; INTRAVENOUS AS NEEDED
Status: DISCONTINUED | OUTPATIENT
Start: 2025-08-18 | End: 2025-08-18

## 2025-08-18 RX ORDER — HEPARIN SODIUM 5000 [USP'U]/ML
5000 INJECTION, SOLUTION INTRAVENOUS; SUBCUTANEOUS ONCE
Status: COMPLETED | OUTPATIENT
Start: 2025-08-18 | End: 2025-08-18

## 2025-08-18 RX ORDER — MAGNESIUM HYDROXIDE 1200 MG/15ML
LIQUID ORAL AS NEEDED
Status: DISCONTINUED | OUTPATIENT
Start: 2025-08-18 | End: 2025-08-18 | Stop reason: HOSPADM

## 2025-08-18 RX ORDER — BACLOFEN 10 MG/1
10 TABLET ORAL 3 TIMES DAILY
Status: DISCONTINUED | OUTPATIENT
Start: 2025-08-18 | End: 2025-08-19 | Stop reason: HOSPADM

## 2025-08-18 RX ORDER — PROPOFOL 10 MG/ML
INJECTION, EMULSION INTRAVENOUS AS NEEDED
Status: DISCONTINUED | OUTPATIENT
Start: 2025-08-18 | End: 2025-08-18

## 2025-08-18 RX ORDER — HYDROMORPHONE HCL/PF 1 MG/ML
0.5 SYRINGE (ML) INJECTION
Status: DISCONTINUED | OUTPATIENT
Start: 2025-08-18 | End: 2025-08-18 | Stop reason: HOSPADM

## 2025-08-18 RX ORDER — ACETAMINOPHEN 10 MG/ML
1000 INJECTION, SOLUTION INTRAVENOUS ONCE
Status: COMPLETED | OUTPATIENT
Start: 2025-08-18 | End: 2025-08-18

## 2025-08-18 RX ORDER — ACETAMINOPHEN 10 MG/ML
1000 INJECTION, SOLUTION INTRAVENOUS EVERY 8 HOURS SCHEDULED
Status: DISCONTINUED | OUTPATIENT
Start: 2025-08-18 | End: 2025-08-19 | Stop reason: HOSPADM

## 2025-08-18 RX ORDER — SODIUM CHLORIDE, SODIUM LACTATE, POTASSIUM CHLORIDE, CALCIUM CHLORIDE 600; 310; 30; 20 MG/100ML; MG/100ML; MG/100ML; MG/100ML
125 INJECTION, SOLUTION INTRAVENOUS CONTINUOUS
Status: DISCONTINUED | OUTPATIENT
Start: 2025-08-18 | End: 2025-08-18

## 2025-08-18 RX ORDER — ONDANSETRON 2 MG/ML
4 INJECTION INTRAMUSCULAR; INTRAVENOUS EVERY 6 HOURS PRN
Status: DISCONTINUED | OUTPATIENT
Start: 2025-08-18 | End: 2025-08-19 | Stop reason: HOSPADM

## 2025-08-18 RX ORDER — HYDROMORPHONE HCL/PF 1 MG/ML
1 SYRINGE (ML) INJECTION EVERY 4 HOURS PRN
Status: DISCONTINUED | OUTPATIENT
Start: 2025-08-18 | End: 2025-08-19 | Stop reason: HOSPADM

## 2025-08-18 RX ORDER — CEFAZOLIN SODIUM 3 G/50ML
3000 SOLUTION INTRAVENOUS ONCE
Status: COMPLETED | OUTPATIENT
Start: 2025-08-18 | End: 2025-08-18

## 2025-08-18 RX ORDER — PANTOPRAZOLE SODIUM 40 MG/1
TABLET, DELAYED RELEASE ORAL
Refills: 0 | OUTPATIENT
Start: 2025-08-18

## 2025-08-18 RX ORDER — SODIUM CHLORIDE, SODIUM LACTATE, POTASSIUM CHLORIDE, CALCIUM CHLORIDE 600; 310; 30; 20 MG/100ML; MG/100ML; MG/100ML; MG/100ML
100 INJECTION, SOLUTION INTRAVENOUS CONTINUOUS
Status: DISCONTINUED | OUTPATIENT
Start: 2025-08-18 | End: 2025-08-19 | Stop reason: HOSPADM

## 2025-08-18 RX ORDER — METOCLOPRAMIDE HYDROCHLORIDE 5 MG/ML
10 INJECTION INTRAMUSCULAR; INTRAVENOUS EVERY 6 HOURS PRN
Status: DISCONTINUED | OUTPATIENT
Start: 2025-08-18 | End: 2025-08-19 | Stop reason: HOSPADM

## 2025-08-18 RX ORDER — PROMETHAZINE HYDROCHLORIDE 25 MG/ML
25 INJECTION, SOLUTION INTRAMUSCULAR; INTRAVENOUS EVERY 6 HOURS PRN
Status: DISCONTINUED | OUTPATIENT
Start: 2025-08-18 | End: 2025-08-19 | Stop reason: HOSPADM

## 2025-08-18 RX ORDER — ONDANSETRON 2 MG/ML
INJECTION INTRAMUSCULAR; INTRAVENOUS AS NEEDED
Status: DISCONTINUED | OUTPATIENT
Start: 2025-08-18 | End: 2025-08-18

## 2025-08-18 RX ORDER — MIDAZOLAM HYDROCHLORIDE 2 MG/2ML
INJECTION, SOLUTION INTRAMUSCULAR; INTRAVENOUS AS NEEDED
Status: DISCONTINUED | OUTPATIENT
Start: 2025-08-18 | End: 2025-08-18

## 2025-08-18 RX ORDER — LORAZEPAM 2 MG/ML
0.5 INJECTION INTRAMUSCULAR EVERY 6 HOURS PRN
Status: DISCONTINUED | OUTPATIENT
Start: 2025-08-18 | End: 2025-08-19 | Stop reason: HOSPADM

## 2025-08-18 RX ORDER — BUPIVACAINE HYDROCHLORIDE 5 MG/ML
INJECTION, SOLUTION EPIDURAL; INTRACAUDAL; PERINEURAL AS NEEDED
Status: DISCONTINUED | OUTPATIENT
Start: 2025-08-18 | End: 2025-08-18 | Stop reason: HOSPADM

## 2025-08-18 RX ORDER — CELECOXIB 100 MG/1
200 CAPSULE ORAL ONCE
Status: COMPLETED | OUTPATIENT
Start: 2025-08-18 | End: 2025-08-18

## 2025-08-18 RX ORDER — ONDANSETRON 2 MG/ML
4 INJECTION INTRAMUSCULAR; INTRAVENOUS ONCE AS NEEDED
Status: DISCONTINUED | OUTPATIENT
Start: 2025-08-18 | End: 2025-08-18 | Stop reason: HOSPADM

## 2025-08-18 RX ORDER — SIMETHICONE 80 MG
80 TABLET,CHEWABLE ORAL EVERY 12 HOURS SCHEDULED
Status: DISCONTINUED | OUTPATIENT
Start: 2025-08-18 | End: 2025-08-19 | Stop reason: HOSPADM

## 2025-08-18 RX ORDER — OXYCODONE HCL 5 MG/5 ML
10 SOLUTION, ORAL ORAL EVERY 4 HOURS PRN
Status: DISCONTINUED | OUTPATIENT
Start: 2025-08-18 | End: 2025-08-19 | Stop reason: HOSPADM

## 2025-08-18 RX ORDER — ACETAMINOPHEN 325 MG/1
1000 TABLET ORAL EVERY 8 HOURS SCHEDULED
Start: 2025-08-18 | End: 2025-08-26

## 2025-08-18 RX ORDER — SODIUM CHLORIDE 9 MG/ML
INJECTION INTRAVENOUS AS NEEDED
Status: DISCONTINUED | OUTPATIENT
Start: 2025-08-18 | End: 2025-08-18 | Stop reason: HOSPADM

## 2025-08-18 RX ORDER — MEPERIDINE HYDROCHLORIDE 25 MG/ML
12.5 INJECTION INTRAMUSCULAR; INTRAVENOUS; SUBCUTANEOUS
Status: DISCONTINUED | OUTPATIENT
Start: 2025-08-18 | End: 2025-08-18 | Stop reason: HOSPADM

## 2025-08-18 RX ORDER — ROCURONIUM BROMIDE 10 MG/ML
INJECTION, SOLUTION INTRAVENOUS AS NEEDED
Status: DISCONTINUED | OUTPATIENT
Start: 2025-08-18 | End: 2025-08-18

## 2025-08-18 RX ORDER — DIPHENHYDRAMINE HCL 25 MG
25 TABLET ORAL
Status: DISCONTINUED | OUTPATIENT
Start: 2025-08-18 | End: 2025-08-19 | Stop reason: HOSPADM

## 2025-08-18 RX ORDER — OXYCODONE HCL 5 MG/5 ML
5 SOLUTION, ORAL ORAL EVERY 4 HOURS PRN
Status: DISCONTINUED | OUTPATIENT
Start: 2025-08-18 | End: 2025-08-19 | Stop reason: HOSPADM

## 2025-08-18 RX ORDER — FAMOTIDINE 10 MG/ML
20 INJECTION, SOLUTION INTRAVENOUS EVERY 12 HOURS SCHEDULED
Status: DISCONTINUED | OUTPATIENT
Start: 2025-08-18 | End: 2025-08-19 | Stop reason: HOSPADM

## 2025-08-18 RX ORDER — HEPARIN SODIUM 5000 [USP'U]/ML
5000 INJECTION, SOLUTION INTRAVENOUS; SUBCUTANEOUS EVERY 8 HOURS SCHEDULED
Status: DISCONTINUED | OUTPATIENT
Start: 2025-08-18 | End: 2025-08-19 | Stop reason: HOSPADM

## 2025-08-18 RX ORDER — PANTOPRAZOLE SODIUM 40 MG/1
40 TABLET, DELAYED RELEASE ORAL DAILY
Qty: 90 TABLET | Refills: 1 | Status: SHIPPED | OUTPATIENT
Start: 2025-08-18

## 2025-08-18 RX ORDER — FENTANYL CITRATE/PF 50 MCG/ML
25 SYRINGE (ML) INJECTION
Status: DISCONTINUED | OUTPATIENT
Start: 2025-08-18 | End: 2025-08-18 | Stop reason: HOSPADM

## 2025-08-18 RX ORDER — KETOROLAC TROMETHAMINE 30 MG/ML
15 INJECTION, SOLUTION INTRAMUSCULAR; INTRAVENOUS EVERY 6 HOURS SCHEDULED
Status: DISCONTINUED | OUTPATIENT
Start: 2025-08-18 | End: 2025-08-19 | Stop reason: HOSPADM

## 2025-08-18 RX ORDER — FENTANYL CITRATE 50 UG/ML
INJECTION, SOLUTION INTRAMUSCULAR; INTRAVENOUS AS NEEDED
Status: DISCONTINUED | OUTPATIENT
Start: 2025-08-18 | End: 2025-08-18

## 2025-08-18 RX ADMIN — SIMETHICONE 80 MG: 80 TABLET, CHEWABLE ORAL at 21:24

## 2025-08-18 RX ADMIN — ACETAMINOPHEN 1000 MG: 10 INJECTION INTRAVENOUS at 13:14

## 2025-08-18 RX ADMIN — ACETAMINOPHEN 1000 MG: 10 INJECTION INTRAVENOUS at 21:24

## 2025-08-18 RX ADMIN — ACETAMINOPHEN 1000 MG: 10 INJECTION INTRAVENOUS at 07:15

## 2025-08-18 RX ADMIN — SODIUM CHLORIDE, SODIUM LACTATE, POTASSIUM CHLORIDE, AND CALCIUM CHLORIDE 125 ML/HR: .6; .31; .03; .02 INJECTION, SOLUTION INTRAVENOUS at 06:35

## 2025-08-18 RX ADMIN — PROPOFOL 300 MG: 10 INJECTION, EMULSION INTRAVENOUS at 07:36

## 2025-08-18 RX ADMIN — CEFAZOLIN SODIUM 3000 MG: 3 SOLUTION INTRAVENOUS at 07:48

## 2025-08-18 RX ADMIN — SODIUM CHLORIDE, SODIUM LACTATE, POTASSIUM CHLORIDE, AND CALCIUM CHLORIDE 100 ML/HR: .6; .31; .03; .02 INJECTION, SOLUTION INTRAVENOUS at 22:56

## 2025-08-18 RX ADMIN — HEPARIN SODIUM 5000 UNITS: 5000 INJECTION INTRAVENOUS; SUBCUTANEOUS at 22:56

## 2025-08-18 RX ADMIN — HEPARIN SODIUM 5000 UNITS: 5000 INJECTION, SOLUTION INTRAVENOUS; SUBCUTANEOUS at 06:50

## 2025-08-18 RX ADMIN — BACLOFEN 10 MG: 10 TABLET ORAL at 21:24

## 2025-08-18 RX ADMIN — ONDANSETRON 4 MG: 2 INJECTION INTRAMUSCULAR; INTRAVENOUS at 07:50

## 2025-08-18 RX ADMIN — DEXAMETHASONE SODIUM PHOSPHATE 10 MG: 10 INJECTION, SOLUTION INTRAMUSCULAR; INTRAVENOUS at 07:50

## 2025-08-18 RX ADMIN — PROPOFOL 120 MCG/KG/MIN: 10 INJECTION, EMULSION INTRAVENOUS at 07:42

## 2025-08-18 RX ADMIN — ROCURONIUM BROMIDE 100 MG: 10 INJECTION, SOLUTION INTRAVENOUS at 07:38

## 2025-08-18 RX ADMIN — REMIFENTANIL HYDROCHLORIDE 0.05 MCG/KG/MIN: 1 INJECTION, POWDER, LYOPHILIZED, FOR SOLUTION INTRAVENOUS at 07:43

## 2025-08-18 RX ADMIN — ROCURONIUM BROMIDE 20 MG: 10 INJECTION, SOLUTION INTRAVENOUS at 08:56

## 2025-08-18 RX ADMIN — SODIUM CHLORIDE, SODIUM LACTATE, POTASSIUM CHLORIDE, AND CALCIUM CHLORIDE 100 ML/HR: .6; .31; .03; .02 INJECTION, SOLUTION INTRAVENOUS at 12:22

## 2025-08-18 RX ADMIN — MIDAZOLAM 2 MG: 1 INJECTION INTRAMUSCULAR; INTRAVENOUS at 07:28

## 2025-08-18 RX ADMIN — FAMOTIDINE 20 MG: 10 INJECTION, SOLUTION INTRAVENOUS at 16:57

## 2025-08-18 RX ADMIN — PROPOFOL 70 MG: 10 INJECTION, EMULSION INTRAVENOUS at 07:41

## 2025-08-18 RX ADMIN — SIMETHICONE 80 MG: 80 TABLET, CHEWABLE ORAL at 14:12

## 2025-08-18 RX ADMIN — KETOROLAC TROMETHAMINE 15 MG: 30 INJECTION, SOLUTION INTRAMUSCULAR; INTRAVENOUS at 16:58

## 2025-08-18 RX ADMIN — CELECOXIB 200 MG: 100 CAPSULE ORAL at 06:48

## 2025-08-18 RX ADMIN — HEPARIN SODIUM 5000 UNITS: 5000 INJECTION INTRAVENOUS; SUBCUTANEOUS at 14:12

## 2025-08-18 RX ADMIN — FENTANYL CITRATE 100 MCG: 50 INJECTION, SOLUTION INTRAMUSCULAR; INTRAVENOUS at 07:36

## 2025-08-18 RX ADMIN — FOSAPREPITANT 150 MG: 150 INJECTION, POWDER, LYOPHILIZED, FOR SOLUTION INTRAVENOUS at 06:36

## 2025-08-18 RX ADMIN — SUGAMMADEX 400 MG: 100 INJECTION, SOLUTION INTRAVENOUS at 09:19

## 2025-08-18 RX ADMIN — LIDOCAINE HYDROCHLORIDE 50 MG: 10 INJECTION, SOLUTION EPIDURAL; INFILTRATION; INTRACAUDAL; PERINEURAL at 07:36

## 2025-08-18 RX ADMIN — BACLOFEN 10 MG: 10 TABLET ORAL at 16:58

## 2025-08-19 VITALS
HEIGHT: 71 IN | TEMPERATURE: 98 F | BODY MASS INDEX: 44.1 KG/M2 | OXYGEN SATURATION: 97 % | SYSTOLIC BLOOD PRESSURE: 127 MMHG | RESPIRATION RATE: 18 BRPM | WEIGHT: 315 LBS | DIASTOLIC BLOOD PRESSURE: 80 MMHG | HEART RATE: 60 BPM

## 2025-08-19 LAB
ANION GAP SERPL CALCULATED.3IONS-SCNC: 7 MMOL/L (ref 4–13)
BUN SERPL-MCNC: 10 MG/DL (ref 5–25)
CALCIUM SERPL-MCNC: 8.1 MG/DL (ref 8.4–10.2)
CHLORIDE SERPL-SCNC: 104 MMOL/L (ref 96–108)
CO2 SERPL-SCNC: 28 MMOL/L (ref 21–32)
CREAT SERPL-MCNC: 0.73 MG/DL (ref 0.6–1.3)
ERYTHROCYTE [DISTWIDTH] IN BLOOD BY AUTOMATED COUNT: 13.6 % (ref 11.6–15.1)
GFR SERPL CREATININE-BSD FRML MDRD: 122 ML/MIN/1.73SQ M
GLUCOSE P FAST SERPL-MCNC: 88 MG/DL (ref 65–99)
GLUCOSE SERPL-MCNC: 88 MG/DL (ref 65–140)
HCT VFR BLD AUTO: 41 % (ref 36.5–49.3)
HGB BLD-MCNC: 13.5 G/DL (ref 12–17)
MCH RBC QN AUTO: 29.2 PG (ref 26.8–34.3)
MCHC RBC AUTO-ENTMCNC: 32.9 G/DL (ref 31.4–37.4)
MCV RBC AUTO: 89 FL (ref 82–98)
PLATELET # BLD AUTO: 213 THOUSANDS/UL (ref 149–390)
PMV BLD AUTO: 9.9 FL (ref 8.9–12.7)
POTASSIUM SERPL-SCNC: 3.7 MMOL/L (ref 3.5–5.3)
RBC # BLD AUTO: 4.63 MILLION/UL (ref 3.88–5.62)
SODIUM SERPL-SCNC: 139 MMOL/L (ref 135–147)
WBC # BLD AUTO: 7.78 THOUSAND/UL (ref 4.31–10.16)

## 2025-08-19 PROCEDURE — NC001 PR NO CHARGE: Performed by: PHYSICIAN ASSISTANT

## 2025-08-19 PROCEDURE — 85027 COMPLETE CBC AUTOMATED: CPT | Performed by: PHYSICIAN ASSISTANT

## 2025-08-19 PROCEDURE — 99024 POSTOP FOLLOW-UP VISIT: CPT | Performed by: PHYSICIAN ASSISTANT

## 2025-08-19 PROCEDURE — 80048 BASIC METABOLIC PNL TOTAL CA: CPT | Performed by: PHYSICIAN ASSISTANT

## 2025-08-19 RX ORDER — BACLOFEN 10 MG/1
10 TABLET ORAL 3 TIMES DAILY
Qty: 21 TABLET | Refills: 0 | Status: SHIPPED | OUTPATIENT
Start: 2025-08-19

## 2025-08-19 RX ADMIN — FAMOTIDINE 20 MG: 10 INJECTION, SOLUTION INTRAVENOUS at 09:13

## 2025-08-19 RX ADMIN — KETOROLAC TROMETHAMINE 15 MG: 30 INJECTION, SOLUTION INTRAMUSCULAR; INTRAVENOUS at 06:11

## 2025-08-19 RX ADMIN — BACLOFEN 10 MG: 10 TABLET ORAL at 09:13

## 2025-08-19 RX ADMIN — ACETAMINOPHEN 1000 MG: 10 INJECTION INTRAVENOUS at 06:11

## 2025-08-19 RX ADMIN — SIMETHICONE 80 MG: 80 TABLET, CHEWABLE ORAL at 09:14

## 2025-08-19 RX ADMIN — HEPARIN SODIUM 5000 UNITS: 5000 INJECTION INTRAVENOUS; SUBCUTANEOUS at 06:11

## 2025-08-21 ENCOUNTER — TELEPHONE (OUTPATIENT)
Dept: BARIATRICS | Facility: CLINIC | Age: 35
End: 2025-08-21

## 2025-08-26 PROBLEM — Z48.815 ENCOUNTER FOR SURGICAL AFTERCARE FOLLOWING SURGERY OF DIGESTIVE SYSTEM: Status: ACTIVE | Noted: 2025-08-26

## (undated) DEVICE — Device

## (undated) DEVICE — PACK PBDS GASTRIC BYPASS NON ROBOTIC

## (undated) DEVICE — ENDO SHEARS 5MM INSTRUMENT WITH UNIPOLAR CAUTERY